# Patient Record
Sex: MALE | Race: WHITE | NOT HISPANIC OR LATINO | Employment: OTHER | URBAN - METROPOLITAN AREA
[De-identification: names, ages, dates, MRNs, and addresses within clinical notes are randomized per-mention and may not be internally consistent; named-entity substitution may affect disease eponyms.]

---

## 2017-02-06 ENCOUNTER — OFFICE VISIT (OUTPATIENT)
Dept: MEDICAL GROUP | Facility: PHYSICIAN GROUP | Age: 82
End: 2017-02-06
Payer: MEDICARE

## 2017-02-06 VITALS
OXYGEN SATURATION: 96 % | WEIGHT: 167 LBS | RESPIRATION RATE: 16 BRPM | DIASTOLIC BLOOD PRESSURE: 80 MMHG | BODY MASS INDEX: 24.73 KG/M2 | SYSTOLIC BLOOD PRESSURE: 128 MMHG | TEMPERATURE: 97.7 F | HEIGHT: 69 IN | HEART RATE: 86 BPM

## 2017-02-06 DIAGNOSIS — G20.A1 PARKINSON'S DISEASE: ICD-10-CM

## 2017-02-06 DIAGNOSIS — E78.5 DYSLIPIDEMIA: ICD-10-CM

## 2017-02-06 DIAGNOSIS — N40.0 BENIGN PROSTATIC HYPERPLASIA, PRESENCE OF LOWER URINARY TRACT SYMPTOMS UNSPECIFIED, UNSPECIFIED MORPHOLOGY: ICD-10-CM

## 2017-02-06 DIAGNOSIS — E03.9 HYPOTHYROIDISM, UNSPECIFIED TYPE: ICD-10-CM

## 2017-02-06 DIAGNOSIS — M81.0 OSTEOPOROSIS: ICD-10-CM

## 2017-02-06 DIAGNOSIS — I10 ESSENTIAL HYPERTENSION: ICD-10-CM

## 2017-02-06 PROCEDURE — 0518F FALL PLAN OF CARE DOCD: CPT | Mod: 8P | Performed by: INTERNAL MEDICINE

## 2017-02-06 PROCEDURE — 99214 OFFICE O/P EST MOD 30 MIN: CPT | Performed by: INTERNAL MEDICINE

## 2017-02-06 PROCEDURE — 1036F TOBACCO NON-USER: CPT | Performed by: INTERNAL MEDICINE

## 2017-02-06 PROCEDURE — 1100F PTFALLS ASSESS-DOCD GE2>/YR: CPT | Performed by: INTERNAL MEDICINE

## 2017-02-06 PROCEDURE — G8484 FLU IMMUNIZE NO ADMIN: HCPCS | Performed by: INTERNAL MEDICINE

## 2017-02-06 PROCEDURE — G8432 DEP SCR NOT DOC, RNG: HCPCS | Performed by: INTERNAL MEDICINE

## 2017-02-06 PROCEDURE — 4040F PNEUMOC VAC/ADMIN/RCVD: CPT | Mod: 8P | Performed by: INTERNAL MEDICINE

## 2017-02-06 PROCEDURE — 3288F FALL RISK ASSESSMENT DOCD: CPT | Performed by: INTERNAL MEDICINE

## 2017-02-06 PROCEDURE — G8420 CALC BMI NORM PARAMETERS: HCPCS | Performed by: INTERNAL MEDICINE

## 2017-02-06 RX ORDER — ALENDRONATE SODIUM 35 MG/1
35 TABLET ORAL
COMMUNITY

## 2017-02-06 NOTE — PROGRESS NOTES
Subjective:   Issa Cuello is a 85 y.o. male here today for osteoporosis, parkinson's disease, hypertension, hypothyroidism, dyslipidemia, BPH    O[facility]osis  [facility] He reports that he had a DEXA scan last Fall (2016) and was found to have osteoporosis. He is on calcium/vitamin D supplementation and was started on fosamax at that time. He reports that he is tolerating it well. He has not had any fractures or falls.     Parkinson's disease  Pt is on carbidopa/levodopa for Parkinson's disease. This is [facility].     Essential hypertension  Pt is on amlodipine 5 mg daily. He reports he is compliant with medication. BP is at goal per JNC 8 critieria today.     Denies chest pain, shortness of breath, headache, blurry vision    Hypothyroidism  Pt is on levothyroxine 75 mcg. Patient is taking medication as prescribed. Most recent labs indicate good control of thyroid.    Denies heat/cold intolerance, change in weight or appetite, change in energy, changes in skin or hair. He has chronic constipation    Dyslipidemia  Pt is on pravastatin 20 mg QHS and red yeast rice. Patient is taking medication as prescribed    Patient denies myalgias. Per chart review there is no elevated liver enzymes recently    BPH (benign prostatic hyperplasia)  Pt is on prazosin 1 mg qhs for BPH. He has good control of nocturia with this medication.          Current medicines (including changes today)  Current Outpatient Prescriptions   Medication Sig Dispense Refill   • alendronate (FOSAMAX) 35 MG tablet Take 35 mg by mouth every 7 days.     • levothyroxine (SYNTHROID) 75 MCG Tab TAKE ONE TABLET BY MOUTH EVERY DAY 90 Tab 1   • amlodipine (NORVASC) 5 MG Tab TAKE ONE TABLET BY MOUTH EVERY DAY 90 Tab 1   • prazosin (MINIPRESS) 1 MG Cap TAKE ONE CAPSULE BY MOUTH EVERY DAY EVENING 90 Cap 1   • pravastatin (PRAVACHOL) 20 MG Tab TAKE ONE TABLET BY MOUTH EVERY DAY EVENING 90 Tab 1   • carbidopa-levodopa SR (SINEMET CR)  " MG per tablet Take 1 Tab by mouth every bedtime.     • sennosides (SENOKOT) 8.6 MG Tab Take 8.6 mg by mouth 1 time daily as needed.     • carbidopa-levodopa (SINEMET)  MG Tab Take 1 Tab by mouth 3 times a day.     • Cyanocobalamin (VITAMIN B 12 PO) Take 1 Tab by mouth every day.     • Red Yeast Rice Extract (RED YEAST RICE PO) Take  by mouth.     • Omega-3 Fatty Acids (FISH OIL PO) Take  by mouth.     • Calcium Carbonate-Vitamin D (CALCIUM + D PO) Take  by mouth.     • aspirin EC (ECOTRIN) 81 MG TBEC Take 81 mg by mouth every day.       No current facility-administered medications for this visit.     He  has a past medical history of HTN (hypertension); Hypothyroidism; Dyslipidemia; Frequent urination at night; Polycythemia; Cancer (CMS-HCC) (1/2013); Arthritis; Parkinson's disease (CMS-HCC) (2/16); Osteopenia (1/16); and Cataract.    ROS   No chest pain, no shortness of breath, no headache, no blurry vision       Objective:     Blood pressure 128/80, pulse 86, temperature 36.5 °C (97.7 °F), resp. rate 16, height 1.753 m (5' 9\"), weight 75.751 kg (167 lb), SpO2 96 %. Body mass index is 24.65 kg/(m^2).   Physical Exam:  Constitutional: Alert & oriented, no acute distress  Eye: Conjunctiva clear, lids normal, no discharge  ENMT: Lips without lesions, normal external nose and ears  Neck: Trachea midline, no masses, no thyromegaly. No cervical or supraclavicular lymphadenopathy  Respiratory: Unlabored respiratory effort, lungs clear to auscultation, no wheezes, no ronchi  Cardiovascular: Normal S1, S2, no murmur, no edema  Skin: Warm, dry, good turgor, no rashes in visible areas  Neuro: resting tremor, shuffling gait  Psych: Normal mood and affect      Assessment and Plan:   The following treatment plan was discussed    1. Osteoporosis  Pt is on alendronate 35 mg qweek, calcium, vitamin D. [facility]    2. Parkinson's disease (CMS-HCC)  Pt is on sinemet, managed [facility]    3. " Essential hypertension  Well controled on amlodipine 5 mg daily. Continue current medication    4. Hypothyroidism, unspecified type  Well controlled on levothyroxine 75 mcg daily. Continue current medications and [facility]    5. Dyslipidemia  Doing well on pravastatin 20 mg and red yeast rice. Continue curent medications.     6. Benign prostatic hyperplasia, presence of lower urinary tract symptoms unspecified, unspecified morphology  Doing well on prazosin. Continue prazosin      Followup: Return in about 1 year (around 2/6/2018).    Please note that this dictation was created using voice recognition software. I have made every reasonable attempt to correct obvious errors, but I expect that there are errors of grammar and possibly content that I did not discover before finalizing the note.

## 2017-02-06 NOTE — ASSESSMENT & PLAN NOTE
[facility] He reports that he had a DEXA scan last Fall (2016) and was found to have osteoporosis. He is on calcium/vitamin D supplementation and was started on fosamax at that time. He reports that he is tolerating it well. He has not had any fractures or falls.

## 2017-02-06 NOTE — ASSESSMENT & PLAN NOTE
Pt is on amlodipine 5 mg daily. He reports he is compliant with medication. BP is at goal per JNC 8 critieria today.     Denies chest pain, shortness of breath, headache, blurry vision

## 2017-02-06 NOTE — ASSESSMENT & PLAN NOTE
Pt is on pravastatin 20 mg QHS and red yeast rice. Patient is taking medication as prescribed    Patient denies myalgias. Per chart review there is no elevated liver enzymes recently

## 2017-02-06 NOTE — ASSESSMENT & PLAN NOTE
Pt is on levothyroxine 75 mcg. Patient is taking medication as prescribed. Most recent labs indicate good control of thyroid.    Denies heat/cold intolerance, change in weight or appetite, change in energy, changes in skin or hair. He has chronic constipation

## 2017-06-23 ENCOUNTER — RESOLUTE PROFESSIONAL BILLING HOSPITAL PROF FEE (OUTPATIENT)
Dept: HOSPITALIST | Facility: MEDICAL CENTER | Age: 82
End: 2017-06-23
Payer: MEDICARE

## 2017-06-23 ENCOUNTER — APPOINTMENT (OUTPATIENT)
Dept: RADIOLOGY | Facility: MEDICAL CENTER | Age: 82
DRG: 552 | End: 2017-06-23
Attending: NEUROLOGICAL SURGERY
Payer: MEDICARE

## 2017-06-23 ENCOUNTER — HOSPITAL ENCOUNTER (INPATIENT)
Facility: MEDICAL CENTER | Age: 82
LOS: 3 days | DRG: 552 | End: 2017-06-26
Attending: EMERGENCY MEDICINE | Admitting: INTERNAL MEDICINE
Payer: MEDICARE

## 2017-06-23 ENCOUNTER — APPOINTMENT (OUTPATIENT)
Dept: RADIOLOGY | Facility: MEDICAL CENTER | Age: 82
DRG: 552 | End: 2017-06-23
Attending: EMERGENCY MEDICINE
Payer: MEDICARE

## 2017-06-23 DIAGNOSIS — S32.011A CLOSED STABLE BURST FRACTURE OF FIRST LUMBAR VERTEBRA, INITIAL ENCOUNTER (HCC): ICD-10-CM

## 2017-06-23 PROBLEM — S32.001A BURST FRACTURE OF LUMBAR VERTEBRA (HCC): Status: ACTIVE | Noted: 2017-06-23

## 2017-06-23 LAB — MAGNESIUM SERPL-MCNC: 2.3 MG/DL (ref 1.5–2.5)

## 2017-06-23 PROCEDURE — 99223 1ST HOSP IP/OBS HIGH 75: CPT | Performed by: INTERNAL MEDICINE

## 2017-06-23 PROCEDURE — 83735 ASSAY OF MAGNESIUM: CPT

## 2017-06-23 PROCEDURE — A9270 NON-COVERED ITEM OR SERVICE: HCPCS | Performed by: EMERGENCY MEDICINE

## 2017-06-23 PROCEDURE — 304562 HCHG STAT O2 MASK/CANNULA

## 2017-06-23 PROCEDURE — 700102 HCHG RX REV CODE 250 W/ 637 OVERRIDE(OP): Performed by: EMERGENCY MEDICINE

## 2017-06-23 PROCEDURE — 99285 EMERGENCY DEPT VISIT HI MDM: CPT

## 2017-06-23 PROCEDURE — 72131 CT LUMBAR SPINE W/O DYE: CPT

## 2017-06-23 PROCEDURE — 700111 HCHG RX REV CODE 636 W/ 250 OVERRIDE (IP): Performed by: EMERGENCY MEDICINE

## 2017-06-23 PROCEDURE — A9270 NON-COVERED ITEM OR SERVICE: HCPCS | Performed by: INTERNAL MEDICINE

## 2017-06-23 PROCEDURE — 700102 HCHG RX REV CODE 250 W/ 637 OVERRIDE(OP): Performed by: INTERNAL MEDICINE

## 2017-06-23 PROCEDURE — 72100 X-RAY EXAM L-S SPINE 2/3 VWS: CPT

## 2017-06-23 PROCEDURE — 770006 HCHG ROOM/CARE - MED/SURG/GYN SEMI*

## 2017-06-23 PROCEDURE — 96374 THER/PROPH/DIAG INJ IV PUSH: CPT

## 2017-06-23 PROCEDURE — 94760 N-INVAS EAR/PLS OXIMETRY 1: CPT

## 2017-06-23 RX ORDER — ONDANSETRON 4 MG/1
4 TABLET, ORALLY DISINTEGRATING ORAL EVERY 4 HOURS PRN
Status: DISCONTINUED | OUTPATIENT
Start: 2017-06-23 | End: 2017-06-26 | Stop reason: HOSPADM

## 2017-06-23 RX ORDER — TRAMADOL HYDROCHLORIDE 50 MG/1
50 TABLET ORAL ONCE
Status: COMPLETED | OUTPATIENT
Start: 2017-06-23 | End: 2017-06-23

## 2017-06-23 RX ORDER — ONDANSETRON 4 MG/1
4 TABLET, ORALLY DISINTEGRATING ORAL ONCE
Status: COMPLETED | OUTPATIENT
Start: 2017-06-23 | End: 2017-06-23

## 2017-06-23 RX ORDER — OXYCODONE HYDROCHLORIDE 5 MG/1
5 TABLET ORAL
Status: DISCONTINUED | OUTPATIENT
Start: 2017-06-23 | End: 2017-06-26 | Stop reason: HOSPADM

## 2017-06-23 RX ORDER — MORPHINE SULFATE 4 MG/ML
4 INJECTION, SOLUTION INTRAMUSCULAR; INTRAVENOUS
Status: DISCONTINUED | OUTPATIENT
Start: 2017-06-23 | End: 2017-06-26 | Stop reason: HOSPADM

## 2017-06-23 RX ORDER — AMLODIPINE BESYLATE 5 MG/1
5 TABLET ORAL
Status: DISCONTINUED | OUTPATIENT
Start: 2017-06-23 | End: 2017-06-26 | Stop reason: HOSPADM

## 2017-06-23 RX ORDER — LIDOCAINE HYDROCHLORIDE AND EPINEPHRINE BITARTRATE 20; .01 MG/ML; MG/ML
10 INJECTION, SOLUTION SUBCUTANEOUS ONCE
Status: DISCONTINUED | OUTPATIENT
Start: 2017-06-23 | End: 2017-06-23 | Stop reason: CLARIF

## 2017-06-23 RX ORDER — POLYETHYLENE GLYCOL 3350 17 G/17G
1 POWDER, FOR SOLUTION ORAL
Status: DISCONTINUED | OUTPATIENT
Start: 2017-06-23 | End: 2017-06-26 | Stop reason: HOSPADM

## 2017-06-23 RX ORDER — CARBIDOPA AND LEVODOPA 50; 200 MG/1; MG/1
1 TABLET, EXTENDED RELEASE ORAL
Status: DISCONTINUED | OUTPATIENT
Start: 2017-06-23 | End: 2017-06-26 | Stop reason: HOSPADM

## 2017-06-23 RX ORDER — MORPHINE SULFATE 4 MG/ML
2 INJECTION, SOLUTION INTRAMUSCULAR; INTRAVENOUS ONCE
Status: COMPLETED | OUTPATIENT
Start: 2017-06-23 | End: 2017-06-23

## 2017-06-23 RX ORDER — PRAZOSIN HYDROCHLORIDE 1 MG/1
1 CAPSULE ORAL EVERY EVENING
Status: DISCONTINUED | OUTPATIENT
Start: 2017-06-23 | End: 2017-06-26 | Stop reason: HOSPADM

## 2017-06-23 RX ORDER — OXYCODONE HYDROCHLORIDE 10 MG/1
10 TABLET ORAL
Status: DISCONTINUED | OUTPATIENT
Start: 2017-06-23 | End: 2017-06-26 | Stop reason: HOSPADM

## 2017-06-23 RX ORDER — AMOXICILLIN 250 MG
2 CAPSULE ORAL 2 TIMES DAILY PRN
Status: DISCONTINUED | OUTPATIENT
Start: 2017-06-23 | End: 2017-06-26 | Stop reason: HOSPADM

## 2017-06-23 RX ORDER — AMOXICILLIN 250 MG
1 CAPSULE ORAL
COMMUNITY
End: 2018-04-05

## 2017-06-23 RX ORDER — BISACODYL 10 MG
10 SUPPOSITORY, RECTAL RECTAL
Status: DISCONTINUED | OUTPATIENT
Start: 2017-06-23 | End: 2017-06-26 | Stop reason: HOSPADM

## 2017-06-23 RX ORDER — ONDANSETRON 2 MG/ML
4 INJECTION INTRAMUSCULAR; INTRAVENOUS EVERY 4 HOURS PRN
Status: DISCONTINUED | OUTPATIENT
Start: 2017-06-23 | End: 2017-06-26 | Stop reason: HOSPADM

## 2017-06-23 RX ORDER — ALENDRONATE SODIUM 10 MG/1
35 TABLET ORAL
Status: DISCONTINUED | OUTPATIENT
Start: 2017-06-25 | End: 2017-06-26 | Stop reason: HOSPADM

## 2017-06-23 RX ORDER — PRAVASTATIN SODIUM 10 MG
20 TABLET ORAL EVERY EVENING
Status: DISCONTINUED | OUTPATIENT
Start: 2017-06-23 | End: 2017-06-26 | Stop reason: HOSPADM

## 2017-06-23 RX ORDER — LEVOTHYROXINE SODIUM 0.07 MG/1
75 TABLET ORAL
Status: DISCONTINUED | OUTPATIENT
Start: 2017-06-23 | End: 2017-06-26 | Stop reason: HOSPADM

## 2017-06-23 RX ADMIN — OXYCODONE HYDROCHLORIDE 5 MG: 5 TABLET ORAL at 15:08

## 2017-06-23 RX ADMIN — MORPHINE SULFATE 2 MG: 4 INJECTION INTRAVENOUS at 11:49

## 2017-06-23 RX ADMIN — PRAZOSIN HYDROCHLORIDE 1 MG: 1 CAPSULE ORAL at 20:57

## 2017-06-23 RX ADMIN — TRAMADOL HYDROCHLORIDE 50 MG: 50 TABLET, COATED ORAL at 09:07

## 2017-06-23 RX ADMIN — ASPIRIN 81 MG: 81 TABLET ORAL at 18:33

## 2017-06-23 RX ADMIN — CARBIDOPA AND LEVODOPA 1 TABLET: 50; 200 TABLET, EXTENDED RELEASE ORAL at 20:57

## 2017-06-23 RX ADMIN — LEVOTHYROXINE SODIUM 75 MCG: 75 TABLET ORAL at 18:33

## 2017-06-23 RX ADMIN — PRAVASTATIN SODIUM 20 MG: 10 TABLET ORAL at 20:57

## 2017-06-23 RX ADMIN — ONDANSETRON 4 MG: 4 TABLET, ORALLY DISINTEGRATING ORAL at 09:07

## 2017-06-23 RX ADMIN — CARBIDOPA AND LEVODOPA 1 TABLET: 25; 100 TABLET ORAL at 20:57

## 2017-06-23 RX ADMIN — AMLODIPINE BESYLATE 5 MG: 5 TABLET ORAL at 18:32

## 2017-06-23 RX ADMIN — OXYCODONE HYDROCHLORIDE 5 MG: 5 TABLET ORAL at 18:32

## 2017-06-23 RX ADMIN — VITAMIN D, TAB 1000IU (100/BT) 1000 UNITS: 25 TAB at 18:32

## 2017-06-23 RX ADMIN — OXYCODONE HYDROCHLORIDE 10 MG: 10 TABLET ORAL at 21:02

## 2017-06-23 ASSESSMENT — COPD QUESTIONNAIRES
HAVE YOU SMOKED AT LEAST 100 CIGARETTES IN YOUR ENTIRE LIFE: NO/DON'T KNOW
COPD SCREENING SCORE: 5
DURING THE PAST 4 WEEKS HOW MUCH DID YOU FEEL SHORT OF BREATH: NONE/LITTLE OF THE TIME
HAVE YOU SMOKED AT LEAST 100 CIGARETTES IN YOUR ENTIRE LIFE: NO/DON'T KNOW
COPD SCREENING SCORE: 5
DO YOU EVER COUGH UP ANY MUCUS OR PHLEGM?: YES, EVERY DAY
DO YOU EVER COUGH UP ANY MUCUS OR PHLEGM?: YES, EVERY DAY
DURING THE PAST 4 WEEKS HOW MUCH DID YOU FEEL SHORT OF BREATH: NONE/LITTLE OF THE TIME

## 2017-06-23 ASSESSMENT — PAIN SCALES - GENERAL
PAINLEVEL_OUTOF10: 10
PAINLEVEL_OUTOF10: 8
PAINLEVEL_OUTOF10: 10
PAINLEVEL_OUTOF10: 8

## 2017-06-23 ASSESSMENT — LIFESTYLE VARIABLES
EVER_SMOKED: NEVER
DO YOU DRINK ALCOHOL: NO
EVER_SMOKED: NEVER
ALCOHOL_USE: NO

## 2017-06-23 NOTE — IP AVS SNAPSHOT
6/26/2017    Issa Cuello  [address] [facility] [address]  [place] [address]    Dear Issa:    [facility] wants to ensure your discharge home is safe and you or your loved ones have had all of your questions answered regarding your care after you leave the hospital.    Below is a list of resources and contact information should you have any questions regarding your hospital stay, follow-up instructions, or active medical symptoms.    Questions or Concerns Regarding… Contact   Medical Questions Related to Your Discharge  (7 days a week, 8am-5pm) Contact a Nurse Care Coordinator   [phone]   Medical Questions Not Related to Your Discharge  (24 hours a day / 7 days a week)  Contact the Nurse Health Line   [phone]    Medications or Discharge Instructions Refer to your discharge packet   or contact your [facility] Primary Care Provider   [phone]   Follow-up Appointment(s) Schedule your appointment via [employer]   or contact Scheduling [phone]   Billing Review your statement via [employer]  or contact Billing [phone]   Medical Records Review your records via [employer]   or contact Medical Records [phone]     You may receive a telephone call within two days of discharge. This call is to make certain you understand your discharge instructions and have the opportunity to have any questions answered. You can also easily access your medical information, test results and upcoming appointments via the [employer] free online health management tool. You can learn more and sign up at [employer]. For assistance setting up your [employer] account, please call [phone].    Once again, we want to ensure your discharge home is safe and that you have a clear understanding of any next steps in your care. If you have any questions or concerns, please do not hesitate to contact us, we are here for you. Thank you for choosing [facility] for your healthcare needs.    Sincerely,    Your [facility] Healthcare Team

## 2017-06-23 NOTE — IP AVS SNAPSHOT
" <p align=\"LEFT\"><IMG SRC=\"[email or web address]\" alt=\"Image\" WIDTH=\"50%\" HEIGHT=\"200\" BORDER=\"\"></p>                   Name:Issa Cuello  Medical Record Number:[id number]  CSN: [id number]    YOB: 1931   Age: 85 y.o.  Sex: male  HT:1.753 m (5' 9\") WT: 72.576 kg (160 lb)          Admit Date: 6/23/2017     Discharge Date:   Today's Date: 6/26/2017  Attending Doctor:  Teodoro Pickard M.D.                  Allergies:  Influenza vaccines          Follow-up Information     1. Follow up with Chad Howe M.D.. Schedule an appointment as soon as possible for a visit in 3 months.    Specialty:  Neurosurgery    Contact information    [address]  [address]  [facility] [address] [phone]          2. Follow up with Celso Steward M.D.. Schedule an appointment as soon as possible for a visit in 2 weeks.    Specialty:  Internal Medicine    Contact information    [address]  [facility] [address] [phone]           Medication List      Take these Medications        Instructions    alendronate 35 MG tablet   Commonly known as:  FOSAMAX    Take 35 mg by mouth every Sunday.   Dose:  35 mg       amlodipine 5 MG Tabs   Commonly known as:  NORVASC    TAKE ONE TABLET BY MOUTH EVERY DAY       aspirin EC 81 MG Tbec   Commonly known as:  ECOTRIN    Take 81 mg by mouth every day.   Dose:  81 mg       CALCIUM + D PO    Take 1 Tab by mouth every day.   Dose:  1 Tab       * carbidopa-levodopa  MG Tabs   Commonly known as:  SINEMET    Take 1 Tab by mouth 2 Times a Day.   Dose:  1 Tab       * carbidopa-levodopa SR  MG per tablet   Commonly known as:  SINEMET CR    Take 1 Tab by mouth every bedtime.   Dose:  1 Tab       FISH OIL PO    Take 2 Tabs by mouth every day.   Dose:  2 Tab       levothyroxine 75 MCG Tabs   Commonly known as:  SYNTHROID    TAKE ONE TABLET BY MOUTH EVERY DAY       pravastatin 20 MG Tabs   Commonly known as:  PRAVACHOL    TAKE ONE TABLET BY MOUTH EVERY DAY EVENING      "    prazosin 1 MG Caps   Commonly known as:  MINIPRESS    TAKE ONE CAPSULE BY MOUTH EVERY DAY EVENING       RED YEAST RICE PO    Take 1 Tab by mouth 2 Times a Day.   Dose:  1 Tab       * senna-docusate 8.6-50 MG Tabs   What changed:  Another medication with the same name was added. Make sure you understand how and when to take each.   Commonly known as:  PERICOLACE or SENOKOT S    Take 1 Tab by mouth 1 time daily as needed.   Dose:  1 Tab       * senna-docusate 8.6-50 MG Tabs   What changed:  You were already taking a medication with the same name, and this prescription was added. Make sure you understand how and when to take each.   Commonly known as:  PERICOLACE or SENOKOT S    Take 2 Tabs by mouth 2 times a day as needed for Constipation.   Dose:  2 Tab       vitamin D 1000 UNIT Tabs   Commonly known as:  cholecalciferol    Take 1,000 Units by mouth every day.   Dose:  1000 Units       * Notice:  This list has 4 medication(s) that are the same as other medications prescribed for you. Read the directions carefully, and ask your doctor or other care provider to review them with you.

## 2017-06-23 NOTE — IP AVS SNAPSHOT
" Home Care Instructions                                                                                                                  Name:Issa Cuello  Medical Record Number:[id number]  CSN: [id number]    YOB: 1931   Age: 85 y.o.  Sex: male  HT:1.753 m (5' 9\") WT: 72.576 kg (160 lb)          Admit Date: 6/23/2017     Discharge Date:   Today's Date: 6/26/2017  Attending Doctor:  Teodoro Pickard M.D.                  Allergies:  Influenza vaccines            Discharge Instructions       Discharge Instructions    Discharged to [facility] by medical transportation with escort. Discharged via wheelchair, hospital escort: Yes.  Special equipment needed: Walker    Be sure to schedule a follow-up appointment with your primary care doctor or any specialists as instructed.     Discharge Plan:   Diet Plan: Discussed  Activity Level: Discussed  Confirmed Follow up Appointment: Patient to Call and Schedule Appointment  Confirmed Symptoms Management: Discussed  Medication Reconciliation Updated: Yes  Influenza Vaccine Indication: Patient Refuses    I understand that a diet low in cholesterol, fat, and sodium is recommended for good health. Unless I have been given specific instructions below for another diet, I accept this instruction as my diet prescription.   Other diet: Dysphagia 2 nector thick diet    Special Instructions: Discharge instructions for the Orthopedic Patient    Follow up with Primary Care Physician within 2 weeks of discharge to home, regarding:  Review of medications and diagnostic testing.  Surveillance for medical complications.  Workup and treatment of osteoporosis, if appropriate.     -Is this a Joint Replacement patient? No    -Is this patient being discharged with medication to prevent blood clots?  No    · Is patient discharged on Warfarin / Coumadin?   No     · Is patient Post Blood Transfusion?  No    Lumbar Fracture  A lumbar fracture is a break in one of the bones of the lower back. " Lumbar fractures range in severity. Severe fractures can damage the spinal cord.  CAUSES  This condition may be caused by:  · A fall (common).  · A car accident (common).  · A gunshot wound.  · A hard, direct hit to the back.  · Osteoporosis.  SYMPTOMS  The main symptom of this condition is severe pain in the lower back. If a fracture is complex or severe, there may also be:  · A misshapen or swollen area on the lower back.  · A limited ability to move an area of the lower back.  · An inability to empty the bladder or bowel.  · A loss of strength or sensation in the legs, feet, and toes.  · Paralysis.  DIAGNOSIS  This condition is diagnosed based on:  · A physical exam.  · Symptoms and what happened just before they developed.  · The results of imaging tests, such as an X-ray, CT scan, or MRI.  If your nerves have been damaged, you may also have other tests to find out how much damage there is.  TREATMENT  Treatment for this condition depends on the specifics of the injury. Most fractures can be treated with:  · A back brace.  · Bed rest and activity restrictions.  · Pain medicine.  · Physical therapy.  Fractures that are complex, involve multiple bones, or make the spine unstable may require surgery to remove pressure from the nerves or spinal cord and to stabilize the broken pieces of bone.  During recovery, it is normal to have pain and stiffness in the back for weeks.  HOME CARE INSTRUCTIONS  Medicines  · Take medicines only as directed by your health care provider.  · Do not drive or operate heavy machinery while taking pain medicine.   Activity  · Stay in bed for as long as directed by your health care provider.  · If you were shown how to do any exercises to improve motion and strength in your back, do them as directed by your health care provider.  · Return to your normal activities as directed by your health care provider. Ask your health care provider what activities are safe for you.  General  Instructions  · If you were given a neck brace or back brace, wear it as directed by your health care provider.  · Keep all follow-up visits as directed by your health care provider. This is important. Failure to follow-up as recommended could result in permanent injury, disability, and long-lasting (chronic) pain.  SEEK MEDICAL CARE IF:  · Your pain does not improve over time.  · You have a persistent cough.  · You cannot return to your normal activities as planned or expected.  SEEK IMMEDIATE MEDICAL CARE IF:  · You have severe pain or your pain suddenly gets worse.  · You are unable to move.  · You have numbness, tingling, weakness, or paralysis in any part of your body.  · You cannot control your bladder or bowel.  · You have difficulty breathing.  · You have a fever.  · You have pain in your chest or abdomen.  · You vomit.     This information is not intended to replace advice given to you by your health care provider. Make sure you discuss any questions you have with your health care provider.     Document Released: 04/03/2008 Document Revised: 05/03/2016 Document Reviewed: 12/14/2015  [employer] Interactive Patient Education ©2016 [employer]    Depression / Suicide Risk    As you are discharged from this [facility] facility, it is important to learn how to keep safe from harming yourself.    Recognize the warning signs:  · Abrupt changes in personality, positive or negative- including increase in energy   · Giving away possessions  · Change in eating patterns- significant weight changes-  positive or negative  · Change in sleeping patterns- unable to sleep or sleeping all the time   · Unwillingness or inability to communicate  · Depression  · Unusual sadness, discouragement and loneliness  · Talk of wanting to die  · Neglect of personal appearance   · Rebelliousness- reckless behavior  · Withdrawal from people/activities they love  · Confusion- inability to concentrate     If you or a loved one observes  any of these behaviors or has concerns about self-harm, here's what you can do:  · Talk about it- your feelings and reasons for harming yourself  · Remove any means that you might use to hurt yourself (examples: pills, rope, extension cords, firearm)  · Get professional help from the community (Mental Health, Substance Abuse, psychological counseling)  · Do not be alone:Call your Safe Contact- someone whom you trust who will be there for you.  · Call your local CRISIS HOTLINE 634-0979 or 462-130-2010  · Call your local Children's Mobile Crisis Response Team [place] (554) 201-1216 or www.[employer]  · Call the toll free National Suicide Prevention Hotlines   · National Suicide Prevention Lifeline 283-218-DBVW (0111)  · [place] Hope Line Network 800-SUICIDE (685-2351)        Follow-up Information     1. Follow up with Chad Howe M.D.. Schedule an appointment as soon as possible for a visit in 3 months.    Specialty:  Neurosurgery    Contact information    [address]  [address]  [place] [address] [phone]          2. Follow up with Celso Steward M.D.. Schedule an appointment as soon as possible for a visit in 2 weeks.    Specialty:  Internal Medicine    Contact information    [address]  [place] [address] [phone]           Discharge Medication Instructions:    Below are the medications your physician expects you to take upon discharge:    Review all your home medications and newly ordered medications with your doctor and/or pharmacist. Follow medication instructions as directed by your doctor and/or pharmacist.    Please keep your medication list with you and share with your physician.               Medication List      CHANGE how you take these medications        Instructions    Morning Afternoon Evening Bedtime    * senna-docusate 8.6-50 MG Tabs   What changed:  Another medication with the same name was added. Make sure you understand how and when to take each.    Commonly known as:  PERICOLACE or SENOKOT S        Take 1 Tab by mouth 1 time daily as needed.   Dose:  1 Tab                        * senna-docusate 8.6-50 MG Tabs   What changed:  You were already taking a medication with the same name, and this prescription was added. Make sure you understand how and when to take each.   Commonly known as:  PERICOLACE or SENOKOT S        Take 2 Tabs by mouth 2 times a day as needed for Constipation.   Dose:  2 Tab                        * Notice:  This list has 2 medication(s) that are the same as other medications prescribed for you. Read the directions carefully, and ask your doctor or other care provider to review them with you.      CONTINUE taking these medications        Instructions    Morning Afternoon Evening Bedtime    alendronate 35 MG tablet   Last time this was given:  35 mg on 6/25/2017  9:42 AM   Commonly known as:  FOSAMAX        Take 35 mg by mouth every Sunday.   Dose:  35 mg                        amlodipine 5 MG Tabs   Last time this was given:  5 mg on 6/25/2017  4:20 PM   Commonly known as:  NORVASC        TAKE ONE TABLET BY MOUTH EVERY DAY                        aspirin EC 81 MG Tbec   Last time this was given:  81 mg on 6/26/2017  9:09 AM   Commonly known as:  ECOTRIN        Take 81 mg by mouth every day.   Dose:  81 mg                        CALCIUM + D PO        Take 1 Tab by mouth every day.   Dose:  1 Tab                        * carbidopa-levodopa  MG Tabs   Last time this was given:  1 Tab on 6/26/2017  2:39 PM   Commonly known as:  SINEMET        Take 1 Tab by mouth 2 Times a Day.   Dose:  1 Tab                        * carbidopa-levodopa SR  MG per tablet   Last time this was given:  1 Tab on 6/25/2017 10:25 PM   Commonly known as:  SINEMET CR        Take 1 Tab by mouth every bedtime.   Dose:  1 Tab                        FISH OIL PO        Take 2 Tabs by mouth every day.   Dose:  2 Tab                        levothyroxine 75 MCG Tabs    Last time this was given:  75 mcg on 6/25/2017  4:20 PM   Commonly known as:  SYNTHROID        TAKE ONE TABLET BY MOUTH EVERY DAY                        pravastatin 20 MG Tabs   Last time this was given:  20 mg on 6/25/2017 10:25 PM   Commonly known as:  PRAVACHOL        TAKE ONE TABLET BY MOUTH EVERY DAY EVENING                        prazosin 1 MG Caps   Last time this was given:  1 mg on 6/25/2017 10:30 PM   Commonly known as:  MINIPRESS        TAKE ONE CAPSULE BY MOUTH EVERY DAY EVENING                        RED YEAST RICE PO        Take 1 Tab by mouth 2 Times a Day.   Dose:  1 Tab                        vitamin D 1000 UNIT Tabs   Last time this was given:  1,000 Units on 6/26/2017  9:09 AM   Commonly known as:  cholecalciferol        Take 1,000 Units by mouth every day.   Dose:  1000 Units                        * Notice:  This list has 2 medication(s) that are the same as other medications prescribed for you. Read the directions carefully, and ask your doctor or other care provider to review them with you.         Where to Get Your Medications      Information about where to get these medications is not yet available     ! Ask your nurse or doctor about these medications    - senna-docusate 8.6-50 MG Tabs            Instructions           Diet / Nutrition:    Follow any diet instructions given to you by your doctor or the dietician, including how much salt (sodium) you are allowed each day.    If you are overweight, talk to your doctor about a weight reduction plan.    Activity:    Remain physically active following your doctor's instructions about exercise and activity.    Rest often.     Any time you become even a little tired or short of breath, SIT DOWN and rest.    Worsening Symptoms:    Report any of the following signs and symptoms to the doctor's office immediately:    *Pain of jaw, arm, or neck  *Chest pain not relieved by medication                               *Dizziness or loss of  consciousness  *Difficulty breathing even when at rest   *More tired than usual                                       *Bleeding drainage or swelling of surgical site  *Swelling of feet, ankles, legs or stomach                 *Fever (>100ºF)  *Pink or blood tinged sputum  *Weight gain (3lbs/day or 5lbs /week)           *Shock from internal defibrillator (if applicable)  *Palpitations or irregular heartbeats                *Cool and/or numb extremities    Stroke Awareness    Common Risk Factors for Stroke include:    Age  Atrial Fibrillation  Carotid Artery Stenosis  Diabetes Mellitus  Excessive alcohol consumption  High blood pressure  Overweight   Physical inactivity  Smoking    Warning signs and symptoms of a stroke include:    *Sudden numbness or weakness of the face, arm or leg (especially on one side of the body).  *Sudden confusion, trouble speaking or understanding.  *Sudden trouble seeing in one or both eyes.  *Sudden trouble walking, dizziness, loss of balance or coordination.Sudden severe headache with no known cause.    It is very important to get treatment quickly when a stroke occurs. If you experience any of the above warning signs, call 911 immediately.                   Disclaimer         Quit Smoking / Tobacco Use:    I understand the use of any tobacco products increases my chance of suffering from future heart disease or stroke and could cause other illnesses which may shorten my life. Quitting the use of tobacco products is the single most important thing I can do to improve my health. For further information on smoking / tobacco cessation call a Toll Free Quit Line at 1-325.968.4677 (*[employer]) or 1-685.282.9328 (American Lung Association) or you can access the web based program at www.lungusa.org.    [place] Tobacco Users Help Line:  (486) 537-4478       Toll Free: 1-806.539.5118  Quit Tobacco Program [facility] (942)485-4408    Crisis Hotline:    National  Crisis Hotline:  1-428-QRYEMNL or 1-687.281.2916    [place] Crisis Hotline:    1-998.913.9067 or 568-188-6948    Discharge Survey:   Thank you for choosing [facility]. We hope we did everything we could to make your hospital stay a pleasant one. You may be receiving a phone survey and we would appreciate your time and participation in answering the questions. Your input is very valuable to us in our efforts to improve our service to our patients and their families.        My signature on this form indicates that:    1. I have reviewed and understand the above information.  2. My questions regarding this information have been answered to my satisfaction.  3. I have formulated a plan with my discharge nurse to obtain my prescribed medications for home.                  Disclaimer         __________________________________                     __________       ________                       Patient Signature                                                 Date                    Time

## 2017-06-23 NOTE — ED NOTES
"Assumed care of pt, pt to have xray done , standing,, TSLO brace placed, pt at baseline with standing, \" normally stands hunched over\" , ERP aware   "

## 2017-06-23 NOTE — ED PROVIDER NOTES
"      ED Provider Note    Scribed for Mary Garza M.D. by Noemí Bennett. 6/23/2017, 8:42 AM.    Primary Care Provider: Celso Steward M.D.  Means of arrival: Walk-in  History obtained from: Patient  History limited by: None    CHIEF COMPLAINT  Chief Complaint   Patient presents with   • T-5000 GLF       HPI  Issa Cuello is a 85 y.o. male who presents to the Emergency Department for evaluation of lower back pain post T-5000 fall around 1100 yesterday. Per patient, he fell \"on his butt\" yesterday while walking his dog due to a loss of balance. He denies any injuries to his head. His wife reports that the patient has had a difficult time walking and getting up out of the bed due to the pain but he is still ambulatory. The patient states that he has only been able to urinate small amounts due to the pain. He denies any loss of sensation in his genital areas. He also states that he was not able to eat yesterday after the incident because he was \"sick to his stomach.\" The patient has a history of arthritis in his back.     REVIEW OF SYSTEMS  Pertinent positives include lower back pain. Pertinent negatives include no loss of sensation in his legs, no head injury.   E.    PAST MEDICAL HISTORY   has a past medical history of HTN (hypertension); Hypothyroidism; Dyslipidemia; Frequent urination at night; Polycythemia; Cancer (CMS-HCC) (1/2013); Arthritis; Parkinson's disease (CMS-HCC) (2/16); Osteopenia (1/16); and Cataract.    SOCIAL HISTORY  Social History   Substance Use Topics   • Smoking status: Never Smoker    • Smokeless tobacco: Never Used   • Alcohol Use: No      History   Drug Use No       SURGICAL HISTORY   has past surgical history that includes inguinal hernia repair; tonsillectomy; other; and cataract extraction with iol (4/3/14).     CURRENT MEDICATIONS  No current facility-administered medications on file prior to encounter.     Current Outpatient Prescriptions on File Prior to Encounter " "  Medication Sig Dispense Refill   • alendronate (FOSAMAX) 35 MG tablet Take 35 mg by mouth every Sunday.     • levothyroxine (SYNTHROID) 75 MCG Tab TAKE ONE TABLET BY MOUTH EVERY DAY 90 Tab 1   • amlodipine (NORVASC) 5 MG Tab TAKE ONE TABLET BY MOUTH EVERY DAY 90 Tab 1   • prazosin (MINIPRESS) 1 MG Cap TAKE ONE CAPSULE BY MOUTH EVERY DAY EVENING 90 Cap 1   • pravastatin (PRAVACHOL) 20 MG Tab TAKE ONE TABLET BY MOUTH EVERY DAY EVENING 90 Tab 1   • carbidopa-levodopa SR (SINEMET CR)  MG per tablet Take 1 Tab by mouth every bedtime.     • carbidopa-levodopa (SINEMET)  MG Tab Take 1 Tab by mouth 2 Times a Day.     • Red Yeast Rice Extract (RED YEAST RICE PO) Take 1 Tab by mouth 2 Times a Day.     • Omega-3 Fatty Acids (FISH OIL PO) Take 2 Tabs by mouth every day.     • Calcium Carbonate-Vitamin D (CALCIUM + D PO) Take 1 Tab by mouth every day.     • aspirin EC (ECOTRIN) 81 MG TBEC Take 81 mg by mouth every day.       ALLERGIES  Allergies   Allergen Reactions   • Influenza Vaccines        PHYSICAL EXAM  VITAL SIGNS: /81 mmHg  Pulse 93  Temp(Src) 36.9 °C (98.5 °F)  Resp 16  Ht 1.753 m (5' 9\")  Wt 72.576 kg (160 lb)  BMI 23.62 kg/m2  SpO2 97%  Constitutional: Alert in no apparent distress. Well appearing  HENT: Normocephalic, Atraumatic, Bilateral external ears normal. Nose normal.   Eyes:  Conjunctiva normal, non-icteric.   Lungs: Non-labored respirations  Skin: Warm, Dry, No erythema, No rash.   Neurologic: Alert, Grossly non-focal. Intact sensation lower extremities.  Psychiatric: Affect normal, Judgment normal, Mood normal, Appears appropriate and not intoxicated.   Back: No L spine deformity or distinct point tenderness, indicates mild pain in mid L spine,     RADIOLOGY  CT-LSPINE W/O PLUS RECONS   Final Result         1. Acute comminuted burst fracture of L1 with minimal retropulsed fragment.      CRITICAL RESULT READ BACK: Preliminary findings discussed with and critical read back performed " by Dr. NAOMI PARKS in the Emergency Department via telephone on 6/23/2017 11:10 AM         DX-LUMBAR SPINE-2 OR 3 VIEWS    (Results Pending)     The radiologist's interpretation of all radiological studies have been reviewed by me.    COURSE & MEDICAL DECISION MAKING  Pertinent Labs & Imaging studies reviewed. (See chart for details)    8:42 AM - Patient seen and examined at bedside. Patient will be treated with 4 mg dispertab Zofran, 50 mg Ultram tablet. Ordered L spine CT to evaluate his symptoms.    10:22 AM Recheck: Patient re-evaluated at beside. Patient reports feeling slightly improved secondary to Ultram. Discussed patient's condition and treatment plan. The patient understood and is in agreement.     11:19 AM Paged neurosurgery.    11:26 AM Spoke with Dr. Krause, Neurosurgery, about the patient's condition. He will order TLSO and upper x-rays.    11:24 AM Paged Hospitalist.    11:29 AM Spoke with Dr. Atwood, Hospitalist, about the patient's condition. He will admit the patient.    11:33 AM Recheck: Patient re-evaluated at [facility]. Discussed patient's condition and treatment plan to admit him discussed. Patient's radiology results which revealed a L1 fracture discussed. The patient understood and is in agreement.       DISPOSITION:  Patient will be admitted to Dr. Atwood in stable condition.    FINAL IMPRESSION  1. Closed stable burst fracture of first lumbar vertebra, initial encounter (CMS-HCC)         This dictation has been created using voice recognition software and/or scribes. The accuracy of the dictation is limited by the abilities of the software and the expertise of the scribes. I expect there may be some errors of grammar and possibly content. I made every attempt to manually correct the errors within my dictation. However, errors related to voice recognition software and/or scribes may still exist and should be interpreted within the appropriate context.     INoemí  (Scribe), am scribing for, and in the presence of, Mary Garza M.D..    Electronically signed by: Noemí Bennett (Scribe), 6/23/2017    I, Mary Garza M.D. personally performed the services described in this documentation, as scribed by Noemí Bennett in my presence, and it is both accurate and complete.    The note accurately reflects work and decisions made by me.  Mary Garza  6/23/2017  3:07 PM

## 2017-06-23 NOTE — IP AVS SNAPSHOT
[employer] Access Code: Activation code not generated  Current [employer] Status: Active    SW[employer]  A secure, online tool to manage your health information     [employer]’s [employer] e-Media® is a secure, online tool that connects you to your personalized health information from the privacy of your home -- day or night - making it very easy for you to manage your healthcare. Once the activation process is completed, you can even access your medical information using the [employer] e-Media keya, which is available for free in the Apple Keya store or Google Play store.     [employer] provides the following levels of access (as shown below):   My Chart Features   [facility] Primary Care Doctor [facility]  Specialists [facility]  Urgent  Care Non-[facility]  Primary Care  Doctor   Email your healthcare team securely and privately 24/7 X X X X   Manage appointments: schedule your next appointment; view details of past/upcoming appointments X      Request prescription refills. X      View recent personal medical records, including lab and immunizations X X X X   View health record, including health history, allergies, medications X X X X   Read reports about your outpatient visits, procedures, consult and ER notes X X X X   See your discharge summary, which is a recap of your hospital and/or ER visit that includes your diagnosis, lab results, and care plan. X X       How to register for [employer]:  1. Go to  [email or web address].  2. Click on the Sign Up Now box, which takes you to the New Member Sign Up page. You will need to provide the following information:  a. Enter your [employer] Access Code exactly as it appears at the top of this page. (You will not need to use this code after you’ve completed the sign-up process. If you do not sign up before the expiration date, you must request a new code.)   b. Enter your date of birth.   c. Enter your home email address.   d. Click Submit, and follow the next screen’s instructions.  3. Create a [employer] ID. This will  be your [employer] login ID and cannot be changed, so think of one that is secure and easy to remember.  4. Create a [employer] password. You can change your password at any time.  5. Enter your Password Reset Question and Answer. This can be used at a later time if you forget your password.   6. Enter your e-mail address. This allows you to receive e-mail notifications when new information is available in [employer].  7. Click Sign Up. You can now view your health information.    For assistance activating your [employer] account, call [phone]

## 2017-06-23 NOTE — PROGRESS NOTES
Full dictation to follow  Reviewed images  L1 burst, appears stable on CT  I have ordered TLSO which I would like him to wear when OOB  I will order him upright lumbar xrays in brace

## 2017-06-23 NOTE — ED NOTES
Pt reports he fell yesterday while feeding the dog, pt walked away from his walker. Pt reports falling straight to his bottom. Pt inNAD. VSS. Pt denies taking blood thinners. Denies any other injury.

## 2017-06-23 NOTE — ED NOTES
The Medication Reconciliation process has been completed by interviewing the patient's wife who had a list.     Allergies have been reviewed  Antibiotic use in 30 days - none    Home [facility]:  [facility]

## 2017-06-23 NOTE — ED NOTES
"Pt reports lumbar back pain after \"falling straight onto bottom\" yesterday afternoon.  Pt reports he was walking his dog, stepped away from his walker and lost his balance.  Pt has history of Parkinson's.   Pt changed into a gown.  Wife at bedside.   "

## 2017-06-24 LAB
ANION GAP SERPL CALC-SCNC: 13 MMOL/L (ref 0–11.9)
BASOPHILS # BLD AUTO: 0.2 % (ref 0–1.8)
BASOPHILS # BLD: 0.03 K/UL (ref 0–0.12)
BUN SERPL-MCNC: 34 MG/DL (ref 8–22)
CALCIUM SERPL-MCNC: 9.3 MG/DL (ref 8.5–10.5)
CHLORIDE SERPL-SCNC: 104 MMOL/L (ref 96–112)
CO2 SERPL-SCNC: 23 MMOL/L (ref 20–33)
CREAT SERPL-MCNC: 1.37 MG/DL (ref 0.5–1.4)
EOSINOPHIL # BLD AUTO: 0.02 K/UL (ref 0–0.51)
EOSINOPHIL NFR BLD: 0.2 % (ref 0–6.9)
ERYTHROCYTE [DISTWIDTH] IN BLOOD BY AUTOMATED COUNT: 45.9 FL (ref 35.9–50)
GFR SERPL CREATININE-BSD FRML MDRD: 49 ML/MIN/1.73 M 2
GLUCOSE SERPL-MCNC: 111 MG/DL (ref 65–99)
HCT VFR BLD AUTO: 48.2 % (ref 42–52)
HGB BLD-MCNC: 16.3 G/DL (ref 14–18)
IMM GRANULOCYTES # BLD AUTO: 0.04 K/UL (ref 0–0.11)
IMM GRANULOCYTES NFR BLD AUTO: 0.3 % (ref 0–0.9)
LYMPHOCYTES # BLD AUTO: 0.98 K/UL (ref 1–4.8)
LYMPHOCYTES NFR BLD: 7.4 % (ref 22–41)
MCH RBC QN AUTO: 32.3 PG (ref 27–33)
MCHC RBC AUTO-ENTMCNC: 33.8 G/DL (ref 33.7–35.3)
MCV RBC AUTO: 95.4 FL (ref 81.4–97.8)
MONOCYTES # BLD AUTO: 1.39 K/UL (ref 0–0.85)
MONOCYTES NFR BLD AUTO: 10.5 % (ref 0–13.4)
NEUTROPHILS # BLD AUTO: 10.78 K/UL (ref 1.82–7.42)
NEUTROPHILS NFR BLD: 81.4 % (ref 44–72)
NRBC # BLD AUTO: 0 K/UL
NRBC BLD AUTO-RTO: 0 /100 WBC
PLATELET # BLD AUTO: 206 K/UL (ref 164–446)
PMV BLD AUTO: 10.5 FL (ref 9–12.9)
POTASSIUM SERPL-SCNC: 4.3 MMOL/L (ref 3.6–5.5)
RBC # BLD AUTO: 5.05 M/UL (ref 4.7–6.1)
SODIUM SERPL-SCNC: 140 MMOL/L (ref 135–145)
WBC # BLD AUTO: 13.2 K/UL (ref 4.8–10.8)

## 2017-06-24 PROCEDURE — 92610 EVALUATE SWALLOWING FUNCTION: CPT

## 2017-06-24 PROCEDURE — 770006 HCHG ROOM/CARE - MED/SURG/GYN SEMI*

## 2017-06-24 PROCEDURE — G8988 SELF CARE GOAL STATUS: HCPCS | Mod: CJ

## 2017-06-24 PROCEDURE — G8979 MOBILITY GOAL STATUS: HCPCS | Mod: CJ

## 2017-06-24 PROCEDURE — G8997 SWALLOW GOAL STATUS: HCPCS | Mod: CH

## 2017-06-24 PROCEDURE — 80048 BASIC METABOLIC PNL TOTAL CA: CPT

## 2017-06-24 PROCEDURE — A9270 NON-COVERED ITEM OR SERVICE: HCPCS | Performed by: INTERNAL MEDICINE

## 2017-06-24 PROCEDURE — G8978 MOBILITY CURRENT STATUS: HCPCS | Mod: CL

## 2017-06-24 PROCEDURE — 700102 HCHG RX REV CODE 250 W/ 637 OVERRIDE(OP): Performed by: INTERNAL MEDICINE

## 2017-06-24 PROCEDURE — 97161 PT EVAL LOW COMPLEX 20 MIN: CPT

## 2017-06-24 PROCEDURE — 99232 SBSQ HOSP IP/OBS MODERATE 35: CPT | Performed by: HOSPITALIST

## 2017-06-24 PROCEDURE — 36415 COLL VENOUS BLD VENIPUNCTURE: CPT

## 2017-06-24 PROCEDURE — G8996 SWALLOW CURRENT STATUS: HCPCS | Mod: CJ

## 2017-06-24 PROCEDURE — G8987 SELF CARE CURRENT STATUS: HCPCS | Mod: CL

## 2017-06-24 PROCEDURE — 85025 COMPLETE CBC W/AUTO DIFF WBC: CPT

## 2017-06-24 PROCEDURE — 97166 OT EVAL MOD COMPLEX 45 MIN: CPT

## 2017-06-24 RX ADMIN — OXYCODONE HYDROCHLORIDE 5 MG: 5 TABLET ORAL at 21:38

## 2017-06-24 RX ADMIN — VITAMIN D, TAB 1000IU (100/BT) 1000 UNITS: 25 TAB at 09:22

## 2017-06-24 RX ADMIN — PRAZOSIN HYDROCHLORIDE 1 MG: 1 CAPSULE ORAL at 21:34

## 2017-06-24 RX ADMIN — CARBIDOPA AND LEVODOPA 1 TABLET: 50; 200 TABLET, EXTENDED RELEASE ORAL at 21:34

## 2017-06-24 RX ADMIN — LEVOTHYROXINE SODIUM 75 MCG: 75 TABLET ORAL at 17:21

## 2017-06-24 RX ADMIN — CARBIDOPA AND LEVODOPA 1 TABLET: 25; 100 TABLET ORAL at 09:22

## 2017-06-24 RX ADMIN — AMLODIPINE BESYLATE 5 MG: 5 TABLET ORAL at 17:21

## 2017-06-24 RX ADMIN — CARBIDOPA AND LEVODOPA 1 TABLET: 25; 100 TABLET ORAL at 21:34

## 2017-06-24 RX ADMIN — PRAVASTATIN SODIUM 20 MG: 10 TABLET ORAL at 21:34

## 2017-06-24 RX ADMIN — ASPIRIN 81 MG: 81 TABLET ORAL at 09:21

## 2017-06-24 RX ADMIN — POLYETHYLENE GLYCOL 3350 1 PACKET: 17 POWDER, FOR SOLUTION ORAL at 21:35

## 2017-06-24 ASSESSMENT — ENCOUNTER SYMPTOMS
SORE THROAT: 0
HEADACHES: 0
PND: 0
BLURRED VISION: 0
SPUTUM PRODUCTION: 0
TREMORS: 0
NAUSEA: 0
TINGLING: 0
MYALGIAS: 1
CONSTIPATION: 0
CLAUDICATION: 0
CHILLS: 0
SENSORY CHANGE: 0
BACK PAIN: 1
SHORTNESS OF BREATH: 0
NERVOUS/ANXIOUS: 0
VOMITING: 0
PALPITATIONS: 0
STRIDOR: 0
HEARTBURN: 0
BLOOD IN STOOL: 0
MEMORY LOSS: 0
DEPRESSION: 0
DOUBLE VISION: 0
FEVER: 0
PHOTOPHOBIA: 0
SPEECH CHANGE: 0
ORTHOPNEA: 0
HEMOPTYSIS: 0
COUGH: 0
DIZZINESS: 0
WEAKNESS: 1
EYE PAIN: 0

## 2017-06-24 ASSESSMENT — COGNITIVE AND FUNCTIONAL STATUS - GENERAL
PERSONAL GROOMING: A LOT
SUGGESTED CMS G CODE MODIFIER DAILY ACTIVITY: CL
MOVING FROM LYING ON BACK TO SITTING ON SIDE OF FLAT BED: A LITTLE
MOBILITY SCORE: 14
TURNING FROM BACK TO SIDE WHILE IN FLAT BAD: A LITTLE
HELP NEEDED FOR BATHING: A LOT
DRESSING REGULAR UPPER BODY CLOTHING: A LOT
DRESSING REGULAR LOWER BODY CLOTHING: A LOT
MOVING TO AND FROM BED TO CHAIR: A LOT
SUGGESTED CMS G CODE MODIFIER MOBILITY: CL
EATING MEALS: A LITTLE
STANDING UP FROM CHAIR USING ARMS: A LOT
CLIMB 3 TO 5 STEPS WITH RAILING: TOTAL
TOILETING: A LOT
DAILY ACTIVITIY SCORE: 13
WALKING IN HOSPITAL ROOM: A LITTLE

## 2017-06-24 ASSESSMENT — GAIT ASSESSMENTS
GAIT LEVEL OF ASSIST: CONTACT GUARD ASSIST
DISTANCE (FEET): 10
DEVIATION: STEP TO;DECREASED BASE OF SUPPORT
ASSISTIVE DEVICE: 4 WHEEL WALKER

## 2017-06-24 ASSESSMENT — PAIN SCALES - GENERAL
PAINLEVEL_OUTOF10: 5
PAINLEVEL_OUTOF10: 0
PAINLEVEL_OUTOF10: 0
PAINLEVEL_OUTOF10: 4
PAINLEVEL_OUTOF10: 0

## 2017-06-24 ASSESSMENT — ACTIVITIES OF DAILY LIVING (ADL): TOILETING: INDEPENDENT

## 2017-06-24 NOTE — THERAPY
"Physical Therapy Evaluation completed.   Bed Mobility:  Supine to Sit: Moderate Assist  Transfers: Sit to Stand: Moderate Assist  Gait: Level Of Assist: Contact Guard Assist with 4-Wheel Walker       Plan of Care: Will benefit from Physical Therapy 4 times per week  Discharge Recommendations: Equipment: 4-Wheel Walker and Will Continue to Assess for Equipment Needs. Post-acute therapy Discharge to a transitional care facility for continued skilled therapy services.    See \"Rehab Therapy-Acute\" Patient Summary Report for complete documentation.       Pt is an 84 yo M with history of Parkinson's disease presents with L1 burst fracture.  Patient requires mod A for bed mobility, CGA for amb, and has marked decrease in tolerance for amb from baseline (reports he was able to walk 30min prior).  Patient lives in ground floor apartment with spouse and dog, no steps to enter.  Pt has 4WW at home.  Will continues to follow pt while in hospital, and pt would benefit from skilled therapy prior to returning home.   "

## 2017-06-24 NOTE — H&P
DATE OF SERVICE:  06/23/2017    ADMITTING ATTENDING:  Hunter Atwood MD    PRIMARY CARE PHYSICIAN:  Celso Steward MD    CONSULTANTS:  Neurosurgery, Chad Howe MD.    CHIEF COMPLAINT:  Ground level fall and back pain.    HISTORY OF PRESENTING ILLNESS:  This is an 85-year-old male with a past   medical history of Parkinson, osteopenia, arthritis, hypertension, who   presented to the ER after a ground level fall.  Patient states that he was   walking his dog when he lost balance and fell on his buttocks.  The patient   then developed severe back pain and it was difficult for him to get back up.    The patient denied any loss of consciousness, chest pain, shortness of breath,   lightheadedness, or seizure-like activity.  Patient denies any previous   falls.  Patient is not on any blood thinners.  At this time, the patient   denies any weakness of his limbs, urinary or bowel incontinence.  He denies   any numbness or tingling.  At this time, the patient reports significant back   pain.    REVIEW OF SYSTEMS:  Please see HPI, all other systems were reviewed and are   negative per AMA and CMS criteria.    PAST MEDICAL HISTORY:  Hypertension, hypothyroidism, dyslipidemia, frequent   urination at night, polycythemia, arthritis, Parkinson disease, osteopenia.    PAST SURGICAL HISTORY:  Inguinal hernia repair x2, tonsillectomy.    CURRENT OUTPATIENT MEDICATIONS:  V[place]a-Colace 8.6/50 one tab daily as needed,   vitamin D 1000 units daily, Fosamax 35 mg every Sunday, Synthroid 75 mcg every   day, amlodipine 5 mg every day, prazosin 1 mg every evening, Pravachol 20 mg   every evening, Sinemet-CR  mg every bedtime, Sinemet  mg tab 1 tab   b.i.d., fish oil, aspirin 81 mg daily.    MEDICATION ALLERGIES:  INFLUENZA VACCINE.    FAMILY HISTORY:  Mother had heart disease and stroke.    SOCIAL HISTORY:  Patient is a never smoker.  Denies alcohol or illicit drug   use.    PHYSICAL EXAMINATION:  VITAL SIGNS:  BMI is 23.  Blood  pressure is 137/77, pulse is 82, temperature   is 99.3, respiratory rate is 17, oxygen saturation is 95% on 2 liters of nasal   cannula.  CONSTITUTIONAL:  An elderly male who is in pain.  HENMT:  Normocephalic, atraumatic.  Oral mucous membranes are moist.  Eyes,   PERRLA. Extraocular muscles intact.  Conjunctivae normal.  NECK:  Supple without lymphadenopathy.  CARDIOVASCULAR:  Normal heart rate, normal rhythm.  No murmurs, cyanosis or   edema.  LUNGS:  Respiratory effort is normal.  Breath sounds are clear to auscultation   bilaterally.  No rales, rhonchi, or wheezing.  ABDOMEN:  Soft, nontender.  No guarding or rebound.  EXTREMITIES:  Pulses are intact.  No cyanosis or edema.  SKIN:  Warm, dry without erythema or rash.  NEUROLOGIC:  Alert and oriented to time, place, and person.  Strength and   sensation is intact bilaterally.  No focal deficits noted.  Cranial nerves   II-XII normal.  MUSCULOSKELETAL:  Straight leg raise is negative bilaterally.  Lumbar spine   point tenderness noted.    PERTINENT LABORATORY DATA REVIEW:  Magnesium is 2.3.  CBC and CMP are pending.    IMAGING REVIEW:  CT spine reveals acute comminuted burst fracture of L1 with   minimal retropulsed fragment.    ASSESSMENT AND PLAN:  1.  Acute burst fracture of L1 -- patient will be admitted to the surgical   unit with management that is expected to take greater than 2 midnights.  The   patient has been evaluated by neurosurgery.  A thoracic-lumbo-sacral orthosis   brace has been ordered, which the patient is recommended to wear when out of   bed.  We will also order physical therapy and occupational therapy.  Patient   will be provided with IV morphine for intractable back pain and we will   monitor his respiratory status closely.  Patient will also be provided   oxycodone for pain p.r.n.  2.  Intractable back pain as above.  3.  Parkinson, this is stable.  Patient will be continued on his Sinemet.  4.  Hypothyroidism.  We will check a TSH and  continue the patient on Synthroid   75.  5.  Hypertension.  The patient's blood pressure is well controlled.  We will   continue him on amlodipine.  6.  Osteopenia.  We will continue the patient on alendronate and vitamin D.  7.  Deep venous thrombosis prophylaxis with sequential compression devices.  8.  Code status:  Do not resuscitate.       ____________________________________     MD TESSIE Sanders / MARCO    DD:  06/23/2017 17:25:29  DT:  06/23/2017 19:30:22    D#:  [id number]  Job#:  [id number]

## 2017-06-24 NOTE — CONSULTS
DATE OF SERVICE:  06/23/2017    DATE OF CONSULTATION:  06/23/2017    CONSULTING PHYSICIAN:  Chad Howe MD.    REQUESTING PHYSICIAN:  Mary Garza MD.    REASON FOR CONSULTATION:  L1 burst fracture.    HISTORY OF PRESENT ILLNESS:  This is an 85-year-old male with past medical   history of Parkinson's disease who lives with his wife at a senior care   center, fell on his sacral region on 06/22/2017 while walking his dog and lost   his balance.  He had immediate onset of back pain although he was still   ambulatory.  He came to the hospital because he was sick to his stomach and   his back was hurting significantly.  He denies weakness, numbness, tingling or   any other neurologic symptoms.    REVIEW OF SYSTEMS:  As stated above, otherwise negative.    PAST MEDICAL HISTORY:  1.  Parkinson's.  2.  Polycythemia.  3.  Arthritis.  4.  Osteopenia.  5.  Hypertension.  6.  Hypothyroidism.  7.  Dyslipidemia.    SOCIAL HISTORY:  He denies alcohol, tobacco or illicit drug use.  He lives   with his wife in a senior care facility.    PAST SURGICAL HISTORY:  1.  Hernia.  2.  Tonsillectomy.  3.  Cataract.    HOME MEDICATIONS:  As noted in the nursing records.    ALLERGIES:  INFLUENZA VACCINE.    PHYSICAL EXAMINATION:  VITAL SIGNS:  Afebrile.  Vital signs stable.  GENERAL:  No acute distress, lying in bed.  HEENT:  Normocephalic, atraumatic.  Pupils equal and reactive to light.    Extraocular movements intact.  NEUROLOGIC:  Cranial nerves II-XII are intact.  Bilateral lower extremity   strength 5/5.  Bilateral upper extremity strength 5/5.  No focal sensory   deficits noted.    IMAGING:  A noncontrast CT of the lumbar spine shows an acute L1 burst   fracture with mild retropulsion and approximately 30-40% loss of height.    There is diffuse osteopenia present.    Upright x-rays in TLSO show unchanged L1 compression fracture height.    LABORATORY DATA:  Full review of labs have been performed in Epic.    ASSESSMENT:  1.   Fall.  2.  L1 burst fracture, 30-40% loss of height, minimal retropulsion, close,   initial.  3.  Acute back pain.    PLAN:  1.  I have reviewed the patient's images, discussed with him and his wife that   his fracture appears stable in his brace.  2.  I have recommended him and his wife that he wear the brace for 3 months   when out of bed or out of his chair.  It sounds that he is generally in a   motorized wheelchair and he does not need to wear his brace while he is in   that.  3.  I would like him to follow up in my clinic in approximately 3 months with   AP and lateral x-rays of his lumbar spine at which time we would hope to   remove his brace.  4.  Patient's diffuse osteopenia, I would not recommend vertebroplasty as the   vertebral bodies above and below tend to undergo compression fractures with   subsequent very minor trauma due to the very hard cement that is juxtaposed to   soft bone.    I am available if he needs any other recommendations or management.       ____________________________________     Chad Howe MD SA / MARCO    DD:  06/23/2017 18:16:56  DT:  06/23/2017 20:50:20    D#:  [id number]  Job#:  [id number]

## 2017-06-24 NOTE — PROGRESS NOTES
[facility] Hospitalist Progress Note    Date of Service: 2017    Chief Complaint  85 y.o. male admitted 2017 with Acute burst fracture of L1 as a result of a ground level fall.     Interval Problem Update  No acute issues, his pain is under control. Neurosurgery recommended wearing back brace for 3 months.  PT/OT today.  SLP following on dysphagia diet.  Pain control    Consultants/Specialty  neurosurgery    Disposition  TBD        Review of Systems   Constitutional: Positive for malaise/fatigue. Negative for fever and chills.   HENT: Negative for congestion, hearing loss, sore throat and tinnitus.    Eyes: Negative for blurred vision, double vision, photophobia and pain.   Respiratory: Negative for cough, hemoptysis, sputum production, shortness of breath and stridor.    Cardiovascular: Negative for chest pain, palpitations, orthopnea, claudication and PND.   Gastrointestinal: Negative for heartburn, nausea, vomiting, constipation, blood in stool and melena.   Genitourinary: Negative for dysuria, urgency and frequency.   Musculoskeletal: Positive for myalgias and back pain.   Neurological: Positive for weakness. Negative for dizziness, tingling, tremors, sensory change, speech change and headaches.   Psychiatric/Behavioral: Negative for depression, suicidal ideas and memory loss. The patient is not nervous/anxious.       Physical Exam  Laboratory/Imaging   Hemodynamics  Temp (24hrs), Av °C (98.6 °F), Min:36.7 °C (98 °F), Max:37.4 °C (99.3 °F)   Temperature: 36.7 °C (98 °F)  Pulse  Av.8  Min: 79  Max: 96    Blood Pressure : 112/59 mmHg, NIBP: 114/49 mmHg      Respiratory      Respiration: 17, Pulse Oximetry: 92 %     Work Of Breathing / Effort: Mild  RUL Breath Sounds: Diminished, RML Breath Sounds: Diminished, RLL Breath Sounds: Diminished, DARREL Breath Sounds: Diminished, LLL Breath Sounds: Diminished    Fluids    Intake/Output Summary (Last 24 hours) at 17 3004  Last data filed at 17  0400   Gross per 24 hour   Intake      0 ml   Output    200 ml   Net   -200 ml       Nutrition  Orders Placed This Encounter   Procedures   • DIET ORDER     Standing Status: Standing      Number of Occurrences: 1      Standing Expiration Date:      Order Specific Question:  Diet:     Answer:  Regular [1]     Order Specific Question:  Texture/Fiber modifications:     Answer:  Dysphagia 2(Pureed/Chopped)specify fluid consistency(question 6) [2]     Order Specific Question:  Consistency/Fluid modifications:     Answer:  Nectar Thick [2]     Physical Exam   Constitutional: He is oriented to person, place, and time. He appears well-developed and well-nourished.   HENT:   Head: Normocephalic and atraumatic.   Mouth/Throat: No oropharyngeal exudate.   Eyes: Conjunctivae are normal. Pupils are equal, round, and reactive to light. Right eye exhibits no discharge. No scleral icterus.   Neck: Neck supple. No JVD present. No thyromegaly present.   Cardiovascular: Intact distal pulses.    No murmur heard.  Pulmonary/Chest: Effort normal and breath sounds normal. No stridor. No respiratory distress. He has no wheezes. He has no rales.   Abdominal: Soft. Bowel sounds are normal. He exhibits no distension. There is no tenderness. There is no rebound.   Musculoskeletal: Normal range of motion. He exhibits no edema.   Neurological: He is alert and oriented to person, place, and time. No cranial nerve deficit.   Resting tremor more pronounced right hand    Skin: Skin is warm. No erythema.   Psychiatric: He has a normal mood and affect. His behavior is normal. Thought content normal.       Recent Labs      06/24/17   0317   WBC  13.2*   RBC  5.05   HEMOGLOBIN  16.3   HEMATOCRIT  48.2   MCV  95.4   MCH  32.3   MCHC  33.8   RDW  45.9   PLATELETCT  206   MPV  10.5     Recent Labs      06/24/17   0317   SODIUM  140   POTASSIUM  4.3   CHLORIDE  104   CO2  23   GLUCOSE  111*   BUN  34*   CREATININE  1.37   CALCIUM  9.3                       Assessment/Plan     Dyslipidemia (present on admission)  Assessment & Plan  Continue pravastatin    Parkinson's disease (CMS-HCC) (present on admission)  Assessment & Plan  Continue sinemet.     Essential hypertension (present on admission)  Assessment & Plan  Continue norvasc, controlled     Hypothyroidism (present on admission)  Assessment & Plan  Continue synthroid.    Burst fracture of lumbar vertebra (CMS-HCC) (present on admission)  Assessment & Plan  L1 burst fracture, 30-40% loss of height, minimal retropulsion, close,    Initial.  Neurosurgery recommended wearing back brace for 3 months  Given patient's diffuse osteopenia, vertebroplasty would not be recommended.  PT/OT today  Pain control     Patient plan of care discussed at multidisplinary team rounds and with patient and R.N at beside.    Labs reviewed, Medications reviewed and Radiology images reviewed  Dumont catheter: No Dumont        DVT prophylaxis - mechanical: SCDs      Assessed for rehab: Patient was assess for and/or received rehabilitation services during this hospitalization

## 2017-06-24 NOTE — PROGRESS NOTES
Patient aox4. NPO for Speech. Pain meds given. Rested well throughout shift. Plan of care ongoing.

## 2017-06-24 NOTE — PROGRESS NOTES
Received bedside shift report from night RN. Pt AOx4.  Pt reports pain is 0/10. Does call appropriately. Bed alarm is on.  Pt is sitting in bed, speech is working with him on a swallow evaluation. PIV assessed and is patent. Pt is on RA. POC discussed as well as unit routine, comfort, and safety. Dicussed DC planning to home is what the pt wants, PT and OT are awaiting evaluation. Discussed the goal for today, speech, PT and OT evaluation . Reviewed orders, notes, labs, and test results. Hourly rounding in place with RN rounding on odd hours and CNA on even hours.

## 2017-06-24 NOTE — THERAPY
"Occupational Therapy Evaluation completed.   Functional Status:  Moderate functional deficit observed. Motivated for independence and return home  Plan of Care: 3 x weekly to focus on ADLS, transfers, Back precautions with task completion  Discharge Recommendations:  Equipment: TBD. Post-acute therapy - yes    See \"Rehab Therapy-Acute\" Patient Summary Report for complete documentation.    "

## 2017-06-24 NOTE — PROGRESS NOTES
Report received. Assumed care of pt. A/O x4. VSS. Responds appropriately. C/o of pain, medicated per MAR. Assessment complete LSO brace on. Discussed POC,safety, pain control, pt verbalizes understanding. Explained importance of calling before getting OOB. Call light and belongings within reach. Bed alarm on. Bed in the lowest position. Treaded socks in place. Hourly rounding in progress. Will continue to monitor .

## 2017-06-24 NOTE — CARE PLAN
Problem: Pain Management  Goal: Pain level will decrease to patient’s comfort goal  Outcome: PROGRESSING AS EXPECTED  Pt denies any pain at this time    Problem: Mobility  Goal: Risk for activity intolerance will decrease  Outcome: PROGRESSING AS EXPECTED  PT and OT has evaluated pt. Suggesting SNF

## 2017-06-24 NOTE — ASSESSMENT & PLAN NOTE
L1 burst fracture, 30-40% loss of height, minimal retropulsion, close,    Initial.  Neurosurgery recommended wearing back brace for 3 months  Given patient's diffuse osteopenia, vertebroplasty would not be recommended.  PT/OT today  Pain control

## 2017-06-25 LAB
ALBUMIN SERPL BCP-MCNC: 4 G/DL (ref 3.2–4.9)
ALBUMIN/GLOB SERPL: 1.5 G/DL
ALP SERPL-CCNC: 54 U/L (ref 30–99)
ALT SERPL-CCNC: 7 U/L (ref 2–50)
ANION GAP SERPL CALC-SCNC: 9 MMOL/L (ref 0–11.9)
AST SERPL-CCNC: 30 U/L (ref 12–45)
BASOPHILS # BLD AUTO: 0.2 % (ref 0–1.8)
BASOPHILS # BLD: 0.03 K/UL (ref 0–0.12)
BILIRUB SERPL-MCNC: 0.9 MG/DL (ref 0.1–1.5)
BUN SERPL-MCNC: 40 MG/DL (ref 8–22)
CALCIUM SERPL-MCNC: 9.3 MG/DL (ref 8.5–10.5)
CHLORIDE SERPL-SCNC: 104 MMOL/L (ref 96–112)
CO2 SERPL-SCNC: 26 MMOL/L (ref 20–33)
CREAT SERPL-MCNC: 1.18 MG/DL (ref 0.5–1.4)
EOSINOPHIL # BLD AUTO: 0.04 K/UL (ref 0–0.51)
EOSINOPHIL NFR BLD: 0.3 % (ref 0–6.9)
ERYTHROCYTE [DISTWIDTH] IN BLOOD BY AUTOMATED COUNT: 44.2 FL (ref 35.9–50)
GFR SERPL CREATININE-BSD FRML MDRD: 59 ML/MIN/1.73 M 2
GLOBULIN SER CALC-MCNC: 2.7 G/DL (ref 1.9–3.5)
GLUCOSE SERPL-MCNC: 133 MG/DL (ref 65–99)
HCT VFR BLD AUTO: 47.2 % (ref 42–52)
HGB BLD-MCNC: 16 G/DL (ref 14–18)
IMM GRANULOCYTES # BLD AUTO: 0.04 K/UL (ref 0–0.11)
IMM GRANULOCYTES NFR BLD AUTO: 0.3 % (ref 0–0.9)
LYMPHOCYTES # BLD AUTO: 0.96 K/UL (ref 1–4.8)
LYMPHOCYTES NFR BLD: 7.5 % (ref 22–41)
MAGNESIUM SERPL-MCNC: 2.2 MG/DL (ref 1.5–2.5)
MCH RBC QN AUTO: 32.1 PG (ref 27–33)
MCHC RBC AUTO-ENTMCNC: 33.9 G/DL (ref 33.7–35.3)
MCV RBC AUTO: 94.6 FL (ref 81.4–97.8)
MONOCYTES # BLD AUTO: 1.44 K/UL (ref 0–0.85)
MONOCYTES NFR BLD AUTO: 11.3 % (ref 0–13.4)
NEUTROPHILS # BLD AUTO: 10.28 K/UL (ref 1.82–7.42)
NEUTROPHILS NFR BLD: 80.4 % (ref 44–72)
NRBC # BLD AUTO: 0 K/UL
NRBC BLD AUTO-RTO: 0 /100 WBC
PLATELET # BLD AUTO: 190 K/UL (ref 164–446)
PMV BLD AUTO: 10.5 FL (ref 9–12.9)
POTASSIUM SERPL-SCNC: 4 MMOL/L (ref 3.6–5.5)
PROT SERPL-MCNC: 6.7 G/DL (ref 6–8.2)
RBC # BLD AUTO: 4.99 M/UL (ref 4.7–6.1)
SODIUM SERPL-SCNC: 139 MMOL/L (ref 135–145)
WBC # BLD AUTO: 12.8 K/UL (ref 4.8–10.8)

## 2017-06-25 PROCEDURE — A9270 NON-COVERED ITEM OR SERVICE: HCPCS | Performed by: INTERNAL MEDICINE

## 2017-06-25 PROCEDURE — 99232 SBSQ HOSP IP/OBS MODERATE 35: CPT | Performed by: HOSPITALIST

## 2017-06-25 PROCEDURE — 36415 COLL VENOUS BLD VENIPUNCTURE: CPT

## 2017-06-25 PROCEDURE — 85025 COMPLETE CBC W/AUTO DIFF WBC: CPT

## 2017-06-25 PROCEDURE — 700102 HCHG RX REV CODE 250 W/ 637 OVERRIDE(OP): Performed by: INTERNAL MEDICINE

## 2017-06-25 PROCEDURE — 83735 ASSAY OF MAGNESIUM: CPT

## 2017-06-25 PROCEDURE — 80053 COMPREHEN METABOLIC PANEL: CPT

## 2017-06-25 PROCEDURE — 770006 HCHG ROOM/CARE - MED/SURG/GYN SEMI*

## 2017-06-25 RX ADMIN — OXYCODONE HYDROCHLORIDE 10 MG: 10 TABLET ORAL at 22:25

## 2017-06-25 RX ADMIN — PRAVASTATIN SODIUM 20 MG: 10 TABLET ORAL at 22:25

## 2017-06-25 RX ADMIN — VITAMIN D, TAB 1000IU (100/BT) 1000 UNITS: 25 TAB at 09:42

## 2017-06-25 RX ADMIN — CARBIDOPA AND LEVODOPA 1 TABLET: 25; 100 TABLET ORAL at 11:28

## 2017-06-25 RX ADMIN — OXYCODONE HYDROCHLORIDE 10 MG: 10 TABLET ORAL at 09:40

## 2017-06-25 RX ADMIN — CARBIDOPA AND LEVODOPA 1 TABLET: 50; 200 TABLET, EXTENDED RELEASE ORAL at 22:25

## 2017-06-25 RX ADMIN — BISACODYL 10 MG: 10 SUPPOSITORY RECTAL at 09:44

## 2017-06-25 RX ADMIN — ASPIRIN 81 MG: 81 TABLET ORAL at 09:42

## 2017-06-25 RX ADMIN — CARBIDOPA AND LEVODOPA 1 TABLET: 25; 100 TABLET ORAL at 16:21

## 2017-06-25 RX ADMIN — AMLODIPINE BESYLATE 5 MG: 5 TABLET ORAL at 16:20

## 2017-06-25 RX ADMIN — LEVOTHYROXINE SODIUM 75 MCG: 75 TABLET ORAL at 16:20

## 2017-06-25 RX ADMIN — OXYCODONE HYDROCHLORIDE 5 MG: 5 TABLET ORAL at 05:45

## 2017-06-25 RX ADMIN — OXYCODONE HYDROCHLORIDE 10 MG: 10 TABLET ORAL at 16:25

## 2017-06-25 RX ADMIN — ALENDRONATE SODIUM 35 MG: 10 TABLET ORAL at 09:42

## 2017-06-25 RX ADMIN — MAGNESIUM HYDROXIDE 30 ML: 400 SUSPENSION ORAL at 11:32

## 2017-06-25 RX ADMIN — PRAZOSIN HYDROCHLORIDE 1 MG: 1 CAPSULE ORAL at 22:30

## 2017-06-25 ASSESSMENT — ENCOUNTER SYMPTOMS
COUGH: 0
DEPRESSION: 0
CONSTIPATION: 0
PND: 0
BLURRED VISION: 0
BACK PAIN: 1
CHILLS: 0
NAUSEA: 0
SPEECH CHANGE: 0
BLOOD IN STOOL: 0
SENSORY CHANGE: 0
EYE PAIN: 0
TINGLING: 0
NERVOUS/ANXIOUS: 0
HEADACHES: 0
HEMOPTYSIS: 0
SORE THROAT: 0
SPUTUM PRODUCTION: 0
MYALGIAS: 1
PHOTOPHOBIA: 0
FEVER: 0
WEAKNESS: 1
PALPITATIONS: 0
ORTHOPNEA: 0
MEMORY LOSS: 0
SHORTNESS OF BREATH: 0
DIZZINESS: 0
DOUBLE VISION: 0
TREMORS: 0
STRIDOR: 0
HEARTBURN: 0
CLAUDICATION: 0
VOMITING: 0

## 2017-06-25 ASSESSMENT — PAIN SCALES - GENERAL
PAINLEVEL_OUTOF10: 5
PAINLEVEL_OUTOF10: 8
PAINLEVEL_OUTOF10: 6
PAINLEVEL_OUTOF10: 7
PAINLEVEL_OUTOF10: 4
PAINLEVEL_OUTOF10: 7
PAINLEVEL_OUTOF10: 7

## 2017-06-25 NOTE — CARE PLAN
Received report from day RN, assumed care at 1915; Pt A&Ox4, slying on bed; Pt pain is 5-6/10 on his back; CMS intact; Pt is up w 1-2 assist, 4WW; PIV  Rt AC assessed and is patent, CDI, SL; Pt is on 1L NC w SaO2 >90%; Call light and personal possessions within reach, discussed safety interventions w pt; Reviewed recent notes, labs, diagnostics, MD orders; POC discussed      Problem: Safety  Goal: Will remain free from injury  Outcome: PROGRESSING AS EXPECTED  Place pt call light and belongings within reached; Instructed to call for assistance, Risk for fall education implemented; Non-skid socks on; Bed alarm is ON;  Bed lowered and locked, upper siderails up; Hourly rounding in place      Problem: Venous Thromboembolism (VTW)/Deep Vein Thrombosis (DVT) Prevention:  Goal: Patient will participate in Venous Thrombosis (VTE)/Deep Vein Thrombosis (DVT)Prevention Measures  Outcome: PROGRESSING AS EXPECTED  SCD's On. ASA scheduled      Problem: Bowel/Gastric:  Goal: Normal bowel function is maintained or improved  Outcome: PROGRESSING AS EXPECTED  Pt Last BM was on 6/21. Miralax given      Problem: Pain Management  Goal: Pain level will decrease to patient’s comfort goal  Outcome: PROGRESSING AS EXPECTED  Medicated per MAR.      Problem: Discharge Barriers/Planning  Goal: Patient’s continuum of care needs will be met  Outcome: PROGRESSING AS EXPECTED  SNF once cleared?

## 2017-06-25 NOTE — PROGRESS NOTES
[facility] Hospitalist Progress Note    Date of Service: 2017    Chief Complaint  85 y.o. male admitted 2017 with Acute burst fracture of L1 as a result of a ground level fall.     Interval Problem Update  No acute issues, his pain is under control. Neurosurgery recommended wearing back brace for 3 months.  PT/OT today.  SLP following on dysphagia diet.  Pain control      Patient reports uncontrolled back pain , is not moving much and reports the back brace being a hassle to place. When questioned on rehab he was hesitant but thinking about it  Cont to titrate medications for pain control  Consideration for SNF?    Consultants/Specialty  neurosurgery    Disposition  TBD        Review of Systems   Constitutional: Positive for malaise/fatigue. Negative for fever and chills.   HENT: Negative for congestion, hearing loss, sore throat and tinnitus.    Eyes: Negative for blurred vision, double vision, photophobia and pain.   Respiratory: Negative for cough, hemoptysis, sputum production, shortness of breath and stridor.    Cardiovascular: Negative for chest pain, palpitations, orthopnea, claudication and PND.   Gastrointestinal: Negative for heartburn, nausea, vomiting, constipation, blood in stool and melena.   Genitourinary: Negative for dysuria, urgency and frequency.   Musculoskeletal: Positive for myalgias and back pain.        Uncontrolled back pain   Neurological: Positive for weakness. Negative for dizziness, tingling, tremors, sensory change, speech change and headaches.   Psychiatric/Behavioral: Negative for depression, suicidal ideas and memory loss. The patient is not nervous/anxious.       Physical Exam  Laboratory/Imaging   Hemodynamics  Temp (24hrs), Av °C (98.6 °F), Min:36.7 °C (98.1 °F), Max:37.2 °C (99 °F)   Temperature: 36.7 °C (98.1 °F)  Pulse  Av.6  Min: 78  Max: 96    Blood Pressure : 134/68 mmHg      Respiratory      Respiration: 18, Pulse Oximetry: 90 %        RUL Breath Sounds:  Clear;Diminished, RML Breath Sounds: Clear;Diminished, RLL Breath Sounds: Diminished, DARREL Breath Sounds: Clear;Diminished, LLL Breath Sounds: Diminished    Fluids    Intake/Output Summary (Last 24 hours) at 06/25/17 1025  Last data filed at 06/25/17 1000   Gross per 24 hour   Intake    540 ml   Output    900 ml   Net   -360 ml       Nutrition  Orders Placed This Encounter   Procedures   • DIET ORDER     Standing Status: Standing      Number of Occurrences: 1      Standing Expiration Date:      Order Specific Question:  Diet:     Answer:  Regular [1]     Order Specific Question:  Texture/Fiber modifications:     Answer:  Dysphagia 2(Pureed/Chopped)specify fluid consistency(question 6) [2]     Order Specific Question:  Consistency/Fluid modifications:     Answer:  Nectar Thick [2]     Physical Exam   Constitutional: He is oriented to person, place, and time. He appears well-developed and well-nourished.   HENT:   Head: Normocephalic and atraumatic.   Mouth/Throat: No oropharyngeal exudate.   Eyes: Conjunctivae are normal. Pupils are equal, round, and reactive to light. Right eye exhibits no discharge. No scleral icterus.   Neck: Neck supple. No JVD present. No thyromegaly present.   Cardiovascular: Intact distal pulses.    No murmur heard.  Pulmonary/Chest: Effort normal and breath sounds normal. No stridor. No respiratory distress. He has no wheezes. He has no rales.   Abdominal: Soft. Bowel sounds are normal. He exhibits no distension. There is no tenderness. There is no rebound.   Musculoskeletal: Normal range of motion. He exhibits no edema.   Neurological: He is alert and oriented to person, place, and time. No cranial nerve deficit.   Resting tremor more pronounced right hand    Skin: Skin is warm. No erythema.   Psychiatric: He has a normal mood and affect. His behavior is normal. Thought content normal.       Recent Labs      06/24/17   0317  06/25/17   0034   WBC  13.2*  12.8*   RBC  5.05  4.99   HEMOGLOBIN   16.3  16.0   HEMATOCRIT  48.2  47.2   MCV  95.4  94.6   MCH  32.3  32.1   MCHC  33.8  33.9   RDW  45.9  44.2   PLATELETCT  206  190   MPV  10.5  10.5     Recent Labs      06/24/17   0317  06/25/17   0034   SODIUM  140  139   POTASSIUM  4.3  4.0   CHLORIDE  104  104   CO2  23  26   GLUCOSE  111*  133*   BUN  34*  40*   CREATININE  1.37  1.18   CALCIUM  9.3  9.3                      Assessment/Plan     Dyslipidemia (present on admission)  Assessment & Plan  Continue pravastatin    Parkinson's disease (CMS-HCC) (present on admission)  Assessment & Plan  Continue sinemet.     Essential hypertension (present on admission)  Assessment & Plan  Continue norvasc, controlled     Hypothyroidism (present on admission)  Assessment & Plan  Continue synthroid.    Burst fracture of lumbar vertebra (CMS-HCC) (present on admission)  Assessment & Plan  L1 burst fracture, 30-40% loss of height, minimal retropulsion, close,    Initial.  Neurosurgery recommended wearing back brace for 3 months  Given patient's diffuse osteopenia, vertebroplasty would not be recommended.  PT/OT recommends SNF, patient thinking about it  Titrate medications for adequate pain control       Patient plan of care discussed at multidisplinary team rounds and with patient and R.N at beside.    Labs reviewed, Medications reviewed and Radiology images reviewed  Dumont catheter: No Dumont        DVT prophylaxis - mechanical: SCDs      Assessed for rehab: Patient was assess for and/or received rehabilitation services during this hospitalization

## 2017-06-26 VITALS
SYSTOLIC BLOOD PRESSURE: 143 MMHG | TEMPERATURE: 97.2 F | HEART RATE: 87 BPM | WEIGHT: 160 LBS | BODY MASS INDEX: 23.7 KG/M2 | HEIGHT: 69 IN | OXYGEN SATURATION: 90 % | RESPIRATION RATE: 16 BRPM | DIASTOLIC BLOOD PRESSURE: 76 MMHG

## 2017-06-26 PROCEDURE — A9270 NON-COVERED ITEM OR SERVICE: HCPCS | Performed by: INTERNAL MEDICINE

## 2017-06-26 PROCEDURE — 99239 HOSP IP/OBS DSCHRG MGMT >30: CPT | Performed by: HOSPITALIST

## 2017-06-26 PROCEDURE — 700102 HCHG RX REV CODE 250 W/ 637 OVERRIDE(OP): Performed by: HOSPITALIST

## 2017-06-26 PROCEDURE — 97116 GAIT TRAINING THERAPY: CPT

## 2017-06-26 PROCEDURE — A9270 NON-COVERED ITEM OR SERVICE: HCPCS | Performed by: HOSPITALIST

## 2017-06-26 PROCEDURE — 97530 THERAPEUTIC ACTIVITIES: CPT

## 2017-06-26 PROCEDURE — 700102 HCHG RX REV CODE 250 W/ 637 OVERRIDE(OP): Performed by: INTERNAL MEDICINE

## 2017-06-26 PROCEDURE — 700105 HCHG RX REV CODE 258: Performed by: HOSPITALIST

## 2017-06-26 RX ORDER — AMOXICILLIN 250 MG
2 CAPSULE ORAL 2 TIMES DAILY PRN
Qty: 30 TAB | Refills: 0
Start: 2017-06-26

## 2017-06-26 RX ORDER — SODIUM CHLORIDE 9 MG/ML
INJECTION, SOLUTION INTRAVENOUS CONTINUOUS
Status: DISCONTINUED | OUTPATIENT
Start: 2017-06-26 | End: 2017-06-26 | Stop reason: HOSPADM

## 2017-06-26 RX ORDER — CALCIUM CARBONATE 500 MG/1
500 TABLET, CHEWABLE ORAL 3 TIMES DAILY PRN
Status: DISCONTINUED | OUTPATIENT
Start: 2017-06-26 | End: 2017-06-26 | Stop reason: HOSPADM

## 2017-06-26 RX ADMIN — AMLODIPINE BESYLATE 5 MG: 5 TABLET ORAL at 17:13

## 2017-06-26 RX ADMIN — VITAMIN D, TAB 1000IU (100/BT) 1000 UNITS: 25 TAB at 09:09

## 2017-06-26 RX ADMIN — OXYCODONE HYDROCHLORIDE 10 MG: 10 TABLET ORAL at 06:36

## 2017-06-26 RX ADMIN — ASPIRIN 81 MG: 81 TABLET ORAL at 09:09

## 2017-06-26 RX ADMIN — CARBIDOPA AND LEVODOPA 1 TABLET: 25; 100 TABLET ORAL at 14:39

## 2017-06-26 RX ADMIN — CARBIDOPA AND LEVODOPA 1 TABLET: 25; 100 TABLET ORAL at 06:36

## 2017-06-26 RX ADMIN — OXYCODONE HYDROCHLORIDE 10 MG: 10 TABLET ORAL at 10:32

## 2017-06-26 RX ADMIN — ANTACID TABLETS 500 MG: 500 TABLET, CHEWABLE ORAL at 13:05

## 2017-06-26 RX ADMIN — LEVOTHYROXINE SODIUM 75 MCG: 75 TABLET ORAL at 17:14

## 2017-06-26 RX ADMIN — SODIUM CHLORIDE: 9 INJECTION, SOLUTION INTRAVENOUS at 13:18

## 2017-06-26 ASSESSMENT — PAIN SCALES - GENERAL
PAINLEVEL_OUTOF10: 3
PAINLEVEL_OUTOF10: 5
PAINLEVEL_OUTOF10: 5
PAINLEVEL_OUTOF10: 4

## 2017-06-26 ASSESSMENT — COGNITIVE AND FUNCTIONAL STATUS - GENERAL
TURNING FROM BACK TO SIDE WHILE IN FLAT BAD: A LITTLE
MOVING TO AND FROM BED TO CHAIR: A LOT
STANDING UP FROM CHAIR USING ARMS: A LITTLE
WALKING IN HOSPITAL ROOM: A LITTLE
MOVING FROM LYING ON BACK TO SITTING ON SIDE OF FLAT BED: A LITTLE
MOBILITY SCORE: 16
CLIMB 3 TO 5 STEPS WITH RAILING: A LOT
SUGGESTED CMS G CODE MODIFIER MOBILITY: CK

## 2017-06-26 ASSESSMENT — GAIT ASSESSMENTS
ASSISTIVE DEVICE: 4 WHEEL WALKER
GAIT LEVEL OF ASSIST: CONTACT GUARD ASSIST
DISTANCE (FEET): 50

## 2017-06-26 ASSESSMENT — LIFESTYLE VARIABLES: EVER_SMOKED: NEVER

## 2017-06-26 NOTE — DISCHARGE PLANNING
Medical Social Work    SW received call from pt's spouse who reported that pt was accepted by [facility], but that [facility] never received referral. SW spoke with Laura from [facility] who confirmed that they had indeed received the SNF referral and pt was accepted.

## 2017-06-26 NOTE — DISCHARGE INSTRUCTIONS
Discharge Instructions    Discharged to [facility] by medical transportation with escort. Discharged via wheelchair, hospital escort: Yes.  Special equipment needed: Walker    Be sure to schedule a follow-up appointment with your primary care doctor or any specialists as instructed.     Discharge Plan:   Diet Plan: Discussed  Activity Level: Discussed  Confirmed Follow up Appointment: Patient to Call and Schedule Appointment  Confirmed Symptoms Management: Discussed  Medication Reconciliation Updated: Yes  Influenza Vaccine Indication: Patient Refuses    I understand that a diet low in cholesterol, fat, and sodium is recommended for good health. Unless I have been given specific instructions below for another diet, I accept this instruction as my diet prescription.   Other diet: Dysphagia 2 nector thick diet    Special Instructions: Discharge instructions for the Orthopedic Patient    Follow up with Primary Care Physician within 2 weeks of discharge to home, regarding:  Review of medications and diagnostic testing.  Surveillance for medical complications.  Workup and treatment of osteoporosis, if appropriate.     -Is this a Joint Replacement patient? No    -Is this patient being discharged with medication to prevent blood clots?  No    · Is patient discharged on Warfarin / Coumadin?   No     · Is patient Post Blood Transfusion?  No    Lumbar Fracture  A lumbar fracture is a break in one of the bones of the lower back. Lumbar fractures range in severity. Severe fractures can damage the spinal cord.  CAUSES  This condition may be caused by:  · A fall (common).  · A car accident (common).  · A gunshot wound.  · A hard, direct hit to the back.  · Osteoporosis.  SYMPTOMS  The main symptom of this condition is severe pain in the lower back. If a fracture is complex or severe, there may also be:  · A misshapen or swollen area on the lower back.  · A limited ability to move an area of the lower back.  · An inability to  empty the bladder or bowel.  · A loss of strength or sensation in the legs, feet, and toes.  · Paralysis.  DIAGNOSIS  This condition is diagnosed based on:  · A physical exam.  · Symptoms and what happened just before they developed.  · The results of imaging tests, such as an X-ray, CT scan, or MRI.  If your nerves have been damaged, you may also have other tests to find out how much damage there is.  TREATMENT  Treatment for this condition depends on the specifics of the injury. Most fractures can be treated with:  · A back brace.  · Bed rest and activity restrictions.  · Pain medicine.  · Physical therapy.  Fractures that are complex, involve multiple bones, or make the spine unstable may require surgery to remove pressure from the nerves or spinal cord and to stabilize the broken pieces of bone.  During recovery, it is normal to have pain and stiffness in the back for weeks.  HOME CARE INSTRUCTIONS  Medicines  · Take medicines only as directed by your health care provider.  · Do not drive or operate heavy machinery while taking pain medicine.   Activity  · Stay in bed for as long as directed by your health care provider.  · If you were shown how to do any exercises to improve motion and strength in your back, do them as directed by your health care provider.  · Return to your normal activities as directed by your health care provider. Ask your health care provider what activities are safe for you.  General Instructions  · If you were given a neck brace or back brace, wear it as directed by your health care provider.  · Keep all follow-up visits as directed by your health care provider. This is important. Failure to follow-up as recommended could result in permanent injury, disability, and long-lasting (chronic) pain.  SEEK MEDICAL CARE IF:  · Your pain does not improve over time.  · You have a persistent cough.  · You cannot return to your normal activities as planned or expected.  SEEK IMMEDIATE MEDICAL CARE  IF:  · You have severe pain or your pain suddenly gets worse.  · You are unable to move.  · You have numbness, tingling, weakness, or paralysis in any part of your body.  · You cannot control your bladder or bowel.  · You have difficulty breathing.  · You have a fever.  · You have pain in your chest or abdomen.  · You vomit.     This information is not intended to replace advice given to you by your health care provider. Make sure you discuss any questions you have with your health care provider.     Document Released: 04/03/2008 Document Revised: 05/03/2016 Document Reviewed: 12/14/2015  [employer] Interactive Patient Education ©2016 [employer]    Depression / Suicide Risk    As you are discharged from this [facility] facility, it is important to learn how to keep safe from harming yourself.    Recognize the warning signs:  · Abrupt changes in personality, positive or negative- including increase in energy   · Giving away possessions  · Change in eating patterns- significant weight changes-  positive or negative  · Change in sleeping patterns- unable to sleep or sleeping all the time   · Unwillingness or inability to communicate  · Depression  · Unusual sadness, discouragement and loneliness  · Talk of wanting to die  · Neglect of personal appearance   · Rebelliousness- reckless behavior  · Withdrawal from people/activities they love  · Confusion- inability to concentrate     If you or a loved one observes any of these behaviors or has concerns about self-harm, here's what you can do:  · Talk about it- your feelings and reasons for harming yourself  · Remove any means that you might use to hurt yourself (examples: pills, rope, extension cords, firearm)  · Get professional help from the community (Mental Health, Substance Abuse, psychological counseling)  · Do not be alone:Call your Safe Contact- someone whom you trust who will be there for you.  · Call your local CRISIS HOTLINE 372-8868 or 878-660-6015  · Call  your local Children's Mobile Crisis Response Team [place] (051) 881-3151 or www.[employer]  · Call the toll free National Suicide Prevention Hotlines   · National Suicide Prevention Lifeline 733-977-ZZJL (6417)  · National Hope Line Network 800-SUICIDE (462-5688)

## 2017-06-26 NOTE — DISCHARGE PLANNING
[facility] received SNF choice form. Per the choice form the referral has been sent to [facility]. Patient needs a SNF order

## 2017-06-26 NOTE — DISCHARGE SUMMARY
CHIEF COMPLAINT ON ADMISSION  Chief Complaint   Patient presents with   • T-5000 GLF       CODE STATUS  DNR    HPI & HOSPITAL COURSE  This is a 85 y.o. male here with Acute burst fracture of L1 as a result of a ground level fall. On admission patient undewent appropriate radiological studies which showed acute L1 burst fracture. Neurosurgery was consulted. Recommended  Wearing a brace for 3 months when out of bed or out of his chair. No surgical intervention was recommended at this time. 3 months with    AP and lateral x-rays of his lumbar spine was recommended. Hence at this point based on physical therapy evaluation patient would benefit from rehabilitation. Patient denies fevers/chills, chest pain, shortness of breath or nausea/vommiting.       Therefore, he is discharged in fair and stable condition with close outpatient follow-up.    SPECIFIC OUTPATIENT FOLLOW-UP  Neurosurgery    3 months with    AP and lateral x-rays of his lumbar spine    DISCHARGE PROBLEM LIST  Active Problems: stable      Dyslipidemia POA: Yes    Parkinson's disease (CMS-HCC) POA: Yes    Essential hypertension POA: Yes    Hypothyroidism POA: Yes    Burst fracture of lumbar vertebra (CMS-HCC) POA: Yes      Overview: 6/23/17       ER visit      L 1      FOLLOW UP  No future appointments.  Chad Howe M.D.  [address]  [address]  [place] [id number] [phone]    Schedule an appointment as soon as possible for a visit in 3 months      Celso Steward M.D.  [address]  [facility] [id number] [phone]    Schedule an appointment as soon as possible for a visit in 2 weeks        MEDICATIONS ON DISCHARGE   Issa Cuello   Home Medication Instructions NANDO:[id number]    Printed on:06/26/17 5545   Medication Information                      alendronate (FOSAMAX) 35 MG tablet  Take 35 mg by mouth every Sunday.             amlodipine (NORVASC) 5 MG Tab  TAKE ONE TABLET BY MOUTH EVERY DAY             aspirin EC (ECOTRIN)  81 MG TBEC  Take 81 mg by mouth every day.             Calcium Carbonate-Vitamin D (CALCIUM + D PO)  Take 1 Tab by mouth every day.             carbidopa-levodopa (SINEMET)  MG Tab  Take 1 Tab by mouth 2 Times a Day.             carbidopa-levodopa SR (SINEMET CR)  MG per tablet  Take 1 Tab by mouth every bedtime.             levothyroxine (SYNTHROID) 75 MCG Tab  TAKE ONE TABLET BY MOUTH EVERY DAY             Omega-3 Fatty Acids (FISH OIL PO)  Take 2 Tabs by mouth every day.             pravastatin (PRAVACHOL) 20 MG Tab  TAKE ONE TABLET BY MOUTH EVERY DAY EVENING             prazosin (MINIPRESS) 1 MG Cap  TAKE ONE CAPSULE BY MOUTH EVERY DAY EVENING             Red Yeast Rice Extract (RED YEAST RICE PO)  Take 1 Tab by mouth 2 Times a Day.             senna-docusate (PERICOLACE OR SENOKOT S) 8.6-50 MG Tab  Take 1 Tab by mouth 1 time daily as needed.             senna-docusate (PERICOLACE OR SENOKOT S) 8.6-50 MG Tab  Take 2 Tabs by mouth 2 times a day as needed for Constipation.             vitamin D (CHOLECALCIFEROL) 1000 UNIT Tab  Take 1,000 Units by mouth every day.                 DIET  Orders Placed This Encounter   Procedures   • DIET ORDER     Standing Status: Standing      Number of Occurrences: 1      Standing Expiration Date:      Order Specific Question:  Diet:     Answer:  Regular [1]     Order Specific Question:  Texture/Fiber modifications:     Answer:  Dysphagia 2(Pureed/Chopped)specify fluid consistency(question 6) [2]     Order Specific Question:  Consistency/Fluid modifications:     Answer:  Nectar Thick [2]       ACTIVITY  As tolerated.  Weight bearing as tolerated with back brace       CONSULTATIONS  Neurosurgery    PROCEDURES  None    LABORATORY  Lab Results   Component Value Date/Time    SODIUM 139 06/25/2017 12:34 AM    POTASSIUM 4.0 06/25/2017 12:34 AM    CHLORIDE 104 06/25/2017 12:34 AM    CO2 26 06/25/2017 12:34 AM    GLUCOSE 133* 06/25/2017 12:34 AM    BUN 40* 06/25/2017 12:34 AM     CREATININE 1.18 06/25/2017 12:34 AM        Lab Results   Component Value Date/Time    WBC 12.8* 06/25/2017 12:34 AM    HEMOGLOBIN 16.0 06/25/2017 12:34 AM    HEMATOCRIT 47.2 06/25/2017 12:34 AM    PLATELET COUNT 190 06/25/2017 12:34 AM        Total time of the discharge process exceeds 38 minutes

## 2017-06-26 NOTE — DISCHARGE PLANNING
Transportation has been arranged to transfer the patient today to [facility] at 1900 via the [facility] van. LENNY on floor Debbie has been notified. Transfer packet and cobra given to LENNY for MD signature.

## 2017-06-26 NOTE — CARE PLAN
Problem: Safety  Goal: Will remain free from injury  Outcome: PROGRESSING AS EXPECTED  Safety precautions in place. Call light within reach. Bed is low and in locked position. Hourly rounding in place. Bed alarm on.     Problem: Discharge Barriers/Planning  Goal: Patient’s continuum of care needs will be met  Outcome: PROGRESSING AS EXPECTED  In the anticipation of d/c planning. Pt is to got to [facility] today at 1900. Pt wife notified.

## 2017-06-26 NOTE — PROGRESS NOTES
Report received from the night nurse. Assumed care. Reviewed labs and medications. Pt is A&O x 4. Patient in bed resting.  Medicated per MAR  Refusing to put SCDs on. Education provided.  Safety precautions in place. Bed in lowered and locked position. Call light within reach. Updated on plan of care.

## 2017-06-26 NOTE — CARE PLAN
Received report from day RN, assumed care at 1915; Pt A&Ox4, slying on bed; Pt pain is 5-6/10 on his back; CMS intact; Pt is up w 1 assist, 4WW; PIV  Rt AC assessed and is patent, CDI, SL; Pt is on 1L NC w SaO2 >90%; Call light and personal possessions within reach, discussed safety interventions w pt; Reviewed recent notes, labs, diagnostics, MD orders; POC discussed      Problem: Safety  Goal: Will remain free from injury  Outcome: PROGRESSING AS EXPECTED  Place pt call light and belongings within reached; Instructed to call for assistance, Risk for fall education implemented; Non-skid socks on; Bed alarm is ON;  Bed lowered and locked, upper siderails up; Hourly rounding in place      Problem: Venous Thromboembolism (VTW)/Deep Vein Thrombosis (DVT) Prevention:  Goal: Patient will participate in Venous Thrombosis (VTE)/Deep Vein Thrombosis (DVT)Prevention Measures  Outcome: PROGRESSING AS EXPECTED  SCD's On. ASA scheduled      Problem: Pain Management  Goal: Pain level will decrease to patient’s comfort goal  Outcome: PROGRESSING AS EXPECTED  Medicated per MAR.      Problem: Discharge Barriers/Planning  Goal: Patient’s continuum of care needs will be met  Outcome: PROGRESSING AS EXPECTED  Pt prefer Home vs SNF, once cleared?

## 2017-06-26 NOTE — DISCHARGE PLANNING
Medical Social Work    SW completed COBRA and transfer packet for pt to go to [facility] today at 1900. SW met with pt at bedside and completed IMM and notified for transport. Pt agreeable to transfer, though indicated wife should be made aware. SW placed completed packet on pt's chart. SW called pt's wife, Alexandria, and confirmed above information. Alexandria denied needing contact info for [facility].

## 2017-06-26 NOTE — PROGRESS NOTES
Called pt wife to let her know that pt will be leaving today to [facility] at 1900. Pt wife verbalized understanding.

## 2017-06-26 NOTE — DISCHARGE PLANNING
Care Transition Team Assessment    IHD met with patient bedside. He stated he lives with his wife and she drives him for appointments. He stated he previously used a walker at home and has a back brace in the hospital. They previously had Meals on Wheels. Patient does not use any O2 or other services at home. He is scheduled to go to a SNF tonight.     Information Source  Orientation : Oriented x 4  Information Given By: Patient  Informant's Name: Issa  Who is responsible for making decisions for patient? : Patient         Elopement Risk  Legal Hold: No  Ambulatory or Self Mobile in Wheelchair: No-Not an Elopement Risk  Elopement Risk: Not at Risk for Elopement    Interdisciplinary Discharge Planning  Does Admitting Nurse Feel This Could be a Complex Discharge?: No  Primary Care Physician: Dr. Celso Steward  Lives with - Patient's Self Care Capacity: Spouse  Support Systems: Family Member(s)  Housing / Facility: 1 Story Apartment / Condo  Do You Take your Prescribed Medications Regularly: Yes  Able to Return to Previous ADL's: Future Time w/Therapy  Mobility Issues: Yes  Prior Services: Meals on Wheels  Patient Expects to be Discharged to:: SNF  Assistance Needed: No  Durable Medical Equipment: Walker, Other - Specify (Back brace)    Discharge Preparedness  What is your plan after discharge?: Skilled nursing facility  What are your discharge supports?: Spouse  Prior Functional Level: Ambulatory, Uses Walker, Independent with Activities of Daily Living, Needs Assist with Medication Management  Difficulity with ADLs: Walking  Difficulty with ADLs Comment: uses walker/back brace  Difficulity with IADLs: Driving, Cooking, Managing medication  Difficulity with IADL Comments: Does not drive, uses meals on wheels, wife manages meds    Functional Assesment  Prior Functional Level: Ambulatory, Uses Walker, Independent with Activities of Daily Living, Needs Assist with Medication Management    Finances  Financial Barriers to  Discharge: No  Prescription Coverage: Yes    Vision / Hearing Impairment  Vision Impairment : Yes  Right Eye Vision: Wears Glasses  Left Eye Vision: Wears Glasses  Hearing Impairment : No    Values / Beliefs / Concerns  Values / Beliefs Concerns : No         Domestic Abuse  Have you ever been the victim of abuse or violence?: No  Physical Abuse or Sexual Abuse: No  Verbal Abuse or Emotional Abuse: No  Possible Abuse Reported to:: Not Applicable    Psychological Assessment  History of Substance Abuse: None  History of Psychiatric Problems: No  Non-compliant with Treatment: No  Newly Diagnosed Illness: No    Discharge Risks or Barriers  Discharge risks or barriers?: No    Anticipated Discharge Information  Anticipated discharge disposition: Discharge needs currently unknown  Discharge Address: [address], [address], [facility] [address]  Discharge Contact Phone Number: [phone]

## 2017-06-26 NOTE — THERAPY
"Pt agreeable to tx today. Pt presents with functional limitations due to pain. Pt able to perform bed mboility @ a Pradip level. Pt sitting EOB balance is poor and pt requires assistance to maintain upright. Pt able to sit<->stand w/Pradip. Pt has a hisroty of Parkinson's Disease which affects pt mobility. pt able to amb further today, but required verbal cuing to maintain upright posture and to slow down. Pt owns a 4 WW, which may be unsafe at this point. Recommend FWW during his stay. Pt would benefit from further acute PT txs and will require placement prior to returning home.    Physical Therapy Treatment completed.   Bed Mobility:  Supine to Sit: Minimal Assist  Transfers: Sit to Stand: Minimal Assist  Gait: Level Of Assist: Contact Guard Assist with 4-Wheel Walker       Plan of Care: Will benefit from Physical Therapy 4 times per week  Discharge Recommendations: Equipment: Will Continue to Assess for Equipment Needs. Post-acute therapy Discharge to a transitional care facility for continued skilled therapy services.     See \"Rehab Therapy-Acute\" Patient Summary Report for complete documentation.       "

## 2017-06-27 NOTE — PROGRESS NOTES
Pt. discharged to [facility]. Pt left unit via wheelchair with escort. IV discontinued. Reviewed discharge instructions, and follow up appointments. Patient states understanding of all the instructions. Discharge instructions and personal belongings with patient upon leaving.

## 2017-08-03 ENCOUNTER — OFFICE VISIT (OUTPATIENT)
Dept: MEDICAL GROUP | Facility: PHYSICIAN GROUP | Age: 82
End: 2017-08-03
Payer: MEDICARE

## 2017-08-03 ENCOUNTER — HOSPITAL ENCOUNTER (OUTPATIENT)
Dept: RADIOLOGY | Facility: MEDICAL CENTER | Age: 82
End: 2017-08-03
Attending: INTERNAL MEDICINE
Payer: MEDICARE

## 2017-08-03 VITALS
TEMPERATURE: 97.7 F | BODY MASS INDEX: 22.96 KG/M2 | HEIGHT: 69 IN | WEIGHT: 155 LBS | HEART RATE: 92 BPM | DIASTOLIC BLOOD PRESSURE: 68 MMHG | RESPIRATION RATE: 12 BRPM | SYSTOLIC BLOOD PRESSURE: 122 MMHG | OXYGEN SATURATION: 98 %

## 2017-08-03 DIAGNOSIS — S32.011D CLOSED STABLE BURST FRACTURE OF FIRST LUMBAR VERTEBRA WITH ROUTINE HEALING, SUBSEQUENT ENCOUNTER: ICD-10-CM

## 2017-08-03 DIAGNOSIS — Z09 HOSPITAL DISCHARGE FOLLOW-UP: ICD-10-CM

## 2017-08-03 PROBLEM — S32.011A CLOSED STABLE BURST FRACTURE OF FIRST LUMBAR VERTEBRA (HCC): Status: ACTIVE | Noted: 2017-08-03

## 2017-08-03 PROCEDURE — 99213 OFFICE O/P EST LOW 20 MIN: CPT | Performed by: INTERNAL MEDICINE

## 2017-08-03 PROCEDURE — 72100 X-RAY EXAM L-S SPINE 2/3 VWS: CPT

## 2017-08-03 NOTE — PROGRESS NOTES
Subjective:   Issa Cuello is a 85 y.o. male here today for hospital discharge follow up, Burst fracture     Hospital discharge follow-up  Pt was recently hospitalized for L1 burst fracture that was from a GLF that occurred while walking his dog and his dog was pulling hard. Neurosurgery was consulted and did not recommend surgery but instead recommended physical therapy. He was sent to [facility] and underwent physical therapy there. He has had improvement in pain since then. The pain is still present but is tolerable. It is worse with long periods of sitting or certain movements. It has improved. His wife takes care of ADL's at home and brought in paperwork to have help at home. He is still doing home physical therapy.     He will be pending neurosurgery follow up again in 6 weeks (3 months from hospitalization).        Current medicines (including changes today)  Current Outpatient Prescriptions   Medication Sig Dispense Refill   • senna-docusate (PERICOLACE OR SENOKOT S) 8.6-50 MG Tab Take 2 Tabs by mouth 2 times a day as needed for Constipation. 30 Tab 0   • senna-docusate (PERICOLACE OR SENOKOT S) 8.6-50 MG Tab Take 1 Tab by mouth 1 time daily as needed.     • vitamin D (CHOLECALCIFEROL) 1000 UNIT Tab Take 1,000 Units by mouth every day.     • alendronate (FOSAMAX) 35 MG tablet Take 35 mg by mouth every Sunday.     • levothyroxine (SYNTHROID) 75 MCG Tab TAKE ONE TABLET BY MOUTH EVERY DAY 90 Tab 1   • amlodipine (NORVASC) 5 MG Tab TAKE ONE TABLET BY MOUTH EVERY DAY 90 Tab 1   • prazosin (MINIPRESS) 1 MG Cap TAKE ONE CAPSULE BY MOUTH EVERY DAY EVENING 90 Cap 1   • pravastatin (PRAVACHOL) 20 MG Tab TAKE ONE TABLET BY MOUTH EVERY DAY EVENING 90 Tab 1   • carbidopa-levodopa SR (SINEMET CR)  MG per tablet Take 1 Tab by mouth every bedtime.     • carbidopa-levodopa (SINEMET)  MG Tab Take 1 Tab by mouth 2 Times a Day.     • Red Yeast Rice Extract (RED YEAST RICE PO) Take 1 Tab by mouth 2 Times a Day.  "    • Calcium Carbonate-Vitamin D (CALCIUM + D PO) Take 1 Tab by mouth every day.     • aspirin EC (ECOTRIN) 81 MG TBEC Take 81 mg by mouth every day.     • Omega-3 Fatty Acids (FISH OIL PO) Take 2 Tabs by mouth every day.       No current facility-administered medications for this visit.     He  has a past medical history of HTN (hypertension); Hypothyroidism; Dyslipidemia; Frequent urination at night; Polycythemia; Cancer (CMS-HCC) (1/2013); Arthritis; Parkinson's disease (CMS-HCC) (2/16); Osteopenia (1/16); and Cataract.    ROS   + back pain     Objective:     Blood pressure 122/68, pulse 92, temperature 36.5 °C (97.7 °F), resp. rate 12, height 1.753 m (5' 9\"), weight 70.308 kg (155 lb), SpO2 98 %. Body mass index is 22.88 kg/(m^2).   Physical Exam:  Constitutional: Alert & oriented, no acute distress  Eye: Conjunctiva clear, lids normal, no discharge  ENMT: Lips without lesions, normal external nose and ears   Respiratory: Unlabored respiratory effort, lungs clear to auscultation, no wheezes, no ronchi  Cardiovascular: Normal S1, S2, no murmur, no edema  Skin: Low back with no redness, breaks in skin, warmth  Neuro: Using FWW  Psych: Normal mood and affect      Assessment and Plan:   The following treatment plan was discussed    1. Hospital discharge follow-up  Pain is improving. Pt continues to work with physical therapy. He is pending follow-up appointment with neurosurgery. We'll order x-ray to follow up status of burst fracture. Otherwise patient is doing fairly well    2. Closed stable burst fracture of first lumbar vertebra with routine healing, subsequent encounter  As above  - DX-LUMBAR SPINE-2 OR 3 VIEWS; Future      Followup: Return in about 3 months (around 11/3/2017).    Please note that this dictation was created using voice recognition software. I have made every reasonable attempt to correct obvious errors, but I expect that there are errors of grammar and possibly content that I did not discover " before finalizing the note.

## 2017-08-03 NOTE — MR AVS SNAPSHOT
"Issa Garcessal   8/3/2017 10:00 AM   Office Visit   MRN: [id number]    Department:  [facility]   Dept Phone:  [phone]    Description:  Male : 1931   Provider:  Celso Steward M.D.           Reason for Visit     Follow-Up           Allergies as of 8/3/2017     Allergen Noted Reactions    Influenza Vaccines 2013         You were diagnosed with     Hospital discharge follow-up   [[id number]]       Closed stable burst fracture of first lumbar vertebra with routine healing, subsequent encounter   [[id number]]         Vital Signs     Blood Pressure Pulse Temperature Respirations Height Weight    122/68 mmHg 92 36.5 °C (97.7 °F) 12 1.753 m (5' 9\") 70.308 kg (155 lb)    Body Mass Index Oxygen Saturation Smoking Status             22.88 kg/m2 98% Never Smoker          Basic Information     Date Of Birth Sex Race Ethnicity Preferred Language    1931 Male White Non- English      Problem List              ICD-10-CM Priority Class Noted - Resolved    Dyslipidemia E78.5   Unknown - Present    Frequent urination at night R35.1   Unknown - Present    CKD (chronic kidney disease), stage III N18.3   2015 - Present    Parkinson's disease (CMS-HCC) G20   2016 - Present    Essential hypertension I10   9/15/2016 - Present    Polycythemia D75.1   9/15/2016 - Present    Skin cancer C44.90   9/15/2016 - Present    Osteoporosis M81.0   2017 - Present    Hypothyroidism E03.9   2017 - Present    BPH (benign prostatic hyperplasia) N40.0   2017 - Present    Burst fracture of lumbar vertebra (CMS-HCC) S32.001A   2017 - Present    Hospital discharge follow-up Z09   8/3/2017 - Present    Closed stable burst fracture of first lumbar vertebra (CMS-HCC) S32.011A   8/3/2017 - Present      Health Maintenance        Date Due Completion Dates    IMM DTaP/Tdap/Td Vaccine (1 - Tdap) 1950 ---    IMM PNEUMOCOCCAL 65+ (ADULT) LOW/MEDIUM RISK SERIES (1 of 2 - PCV13) 1996 ---    IMM INFLUENZA " (1) 2/6/2018 (Originally 9/1/2017) 9/28/2015 (Declined)    Override on 9/28/2015: Patient Declined (allergy to eggs)            Current Immunizations     SHINGLES VACCINE 2/1/2005      Below and/or attached are the medications your provider expects you to take. Review all of your home medications and newly ordered medications with your provider and/or pharmacist. Follow medication instructions as directed by your provider and/or pharmacist. Please keep your medication list with you and share with your provider. Update the information when medications are discontinued, doses are changed, or new medications (including over-the-counter products) are added; and carry medication information at all times in the event of emergency situations     Allergies:  INFLUENZA VACCINES - (reactions not documented)               Medications  Valid as of: August 03, 2017 -  1:12 PM    Generic Name Brand Name Tablet Size Instructions for use    Alendronate Sodium (Tab) FOSAMAX 35 MG Take 35 mg by mouth every Sunday.        AmLODIPine Besylate (Tab) NORVASC 5 MG TAKE ONE TABLET BY MOUTH EVERY DAY        Aspirin (Tablet Delayed Response) ECOTRIN 81 MG Take 81 mg by mouth every day.        Calcium Citrate-Vitamin D   Take 1 Tab by mouth every day.        Carbidopa-Levodopa (Tab) SINEMET  MG Take 1 Tab by mouth 2 Times a Day.        Carbidopa-Levodopa (Tab CR) SINEMET CR  MG Take 1 Tab by mouth every bedtime.        Cholecalciferol (Tab) cholecalciferol 1000 UNIT Take 1,000 Units by mouth every day.        Levothyroxine Sodium (Tab) SYNTHROID 75 MCG TAKE ONE TABLET BY MOUTH EVERY DAY        Omega-3 Fatty Acids   Take 2 Tabs by mouth every day.        Pravastatin Sodium (Tab) PRAVACHOL 20 MG TAKE ONE TABLET BY MOUTH EVERY DAY EVENING        Prazosin HCl (Cap) MINIPRESS 1 MG TAKE ONE CAPSULE BY MOUTH EVERY DAY EVENING        Red Yeast Rice Extract   Take 1 Tab by mouth 2 Times a Day.        Sennosides-Docusate Sodium (Tab)  PERICOLACE or SENOKOT S 8.6-50 MG Take 1 Tab by mouth 1 time daily as needed.        Sennosides-Docusate Sodium (Tab) PERICOLACE or SENOKOT S 8.6-50 MG Take 2 Tabs by mouth 2 times a day as needed for Constipation.        .                 Medicines prescribed today were sent to:     None      Medication refill instructions:       If your prescription bottle indicates you have medication refills left, it is not necessary to call your provider’s office. Please contact your pharmacy and they will refill your medication.    If your prescription bottle indicates you do not have any refills left, you may request refills at any time through one of the following ways: The online Nitro system (except Urgent Care), by calling your provider’s office, or by asking your pharmacy to contact your provider’s office with a refill request. Medication refills are processed only during regular business hours and may not be available until the next business day. Your provider may request additional information or to have a follow-up visit with you prior to refilling your medication.   *Please Note: Medication refills are assigned a new Rx number when refilled electronically. Your pharmacy may indicate that no refills were authorized even though a new prescription for the same medication is available at the pharmacy. Please request the medicine by name with the pharmacy before contacting your provider for a refill.        Your To Do List     Future Labs/Procedures Complete By Expires    DX-LUMBAR SPINE-2 OR 3 VIEWS  As directed 8/3/2018      Other Notes About Your Plan     No problems identified through SCP    Assess and Status Chronic Disease from Current and Prior Visits.     Query: None    Please have this patient sign a medical release for [facility] for [facility] and 2015           Nitro Access Code: Activation code not generated  Current Nitro Status: Active

## 2017-08-03 NOTE — ASSESSMENT & PLAN NOTE
Pt was recently hospitalized for L1 burst fracture that was from a GLF that occurred while walking his dog and his dog was pulling hard. Neurosurgery was consulted and did not recommend surgery but instead recommended physical therapy. He was sent to [facility] and underwent physical therapy there. He has had improvement in pain since then. The pain is still present but is tolerable. It is worse with long periods of sitting or certain movements. It has improved. His wife takes care of ADL's at home and brought in paperwork to have help at home. He is still doing home physical therapy.     He will be pending neurosurgery follow up again in 6 weeks (3 months from hospitalization).

## 2017-09-13 ENCOUNTER — PATIENT OUTREACH (OUTPATIENT)
Dept: HEALTH INFORMATION MANAGEMENT | Facility: OTHER | Age: 82
End: 2017-09-13

## 2017-09-13 NOTE — PROGRESS NOTES
Attempt #:1    WebIZ Checked & Epic Updated: yes  HealthConnect Verified: yes  Verify PCP: yes    Communication Preference Obtained: yes     Review Care Team: yes    Annual Wellness Visit Scheduling  1. Scheduling Status:Scheduled        Care Gap Scheduling (Attempt to Schedule EACH Overdue Care Gap!)     Health Maintenance Due   Topic Date Due   • IMM DTaP/Tdap/Td Vaccine (1 - Tdap) SCHEDULED   • IMM PNEUMOCOCCAL 65+ (ADULT) LOW/MEDIUM RISK SERIES (1 of 2 - PCV13) PER WIFE, PT. MIGHT [facility]ACC[facility]. SHE WILL BRING RECORD IN          [employer] Activation: already active  [employer] Keya: no  Virtual Visits: no  Opt In to Text Messages: no

## 2017-09-19 ENCOUNTER — OFFICE VISIT (OUTPATIENT)
Dept: MEDICAL GROUP | Facility: PHYSICIAN GROUP | Age: 82
End: 2017-09-19
Payer: MEDICARE

## 2017-09-19 ENCOUNTER — TELEPHONE (OUTPATIENT)
Dept: MEDICAL GROUP | Facility: PHYSICIAN GROUP | Age: 82
End: 2017-09-19

## 2017-09-19 VITALS
TEMPERATURE: 97.9 F | WEIGHT: 159 LBS | SYSTOLIC BLOOD PRESSURE: 126 MMHG | OXYGEN SATURATION: 97 % | DIASTOLIC BLOOD PRESSURE: 64 MMHG | HEART RATE: 94 BPM | BODY MASS INDEX: 23.55 KG/M2 | HEIGHT: 69 IN

## 2017-09-19 DIAGNOSIS — N18.30 CKD (CHRONIC KIDNEY DISEASE), STAGE III (HCC): ICD-10-CM

## 2017-09-19 DIAGNOSIS — E03.9 HYPOTHYROIDISM, UNSPECIFIED TYPE: ICD-10-CM

## 2017-09-19 DIAGNOSIS — Z00.00 WELLNESS EXAMINATION: ICD-10-CM

## 2017-09-19 DIAGNOSIS — S32.011D CLOSED STABLE BURST FRACTURE OF FIRST LUMBAR VERTEBRA WITH ROUTINE HEALING, SUBSEQUENT ENCOUNTER: ICD-10-CM

## 2017-09-19 DIAGNOSIS — G20.A1 PARKINSON'S DISEASE: ICD-10-CM

## 2017-09-19 DIAGNOSIS — M81.0 OSTEOPOROSIS, UNSPECIFIED OSTEOPOROSIS TYPE, UNSPECIFIED PATHOLOGICAL FRACTURE PRESENCE: ICD-10-CM

## 2017-09-19 DIAGNOSIS — E78.5 DYSLIPIDEMIA: ICD-10-CM

## 2017-09-19 DIAGNOSIS — I10 ESSENTIAL HYPERTENSION: ICD-10-CM

## 2017-09-19 DIAGNOSIS — N40.0 BENIGN PROSTATIC HYPERPLASIA, PRESENCE OF LOWER URINARY TRACT SYMPTOMS UNSPECIFIED, UNSPECIFIED MORPHOLOGY: ICD-10-CM

## 2017-09-19 PROCEDURE — G0439 PPPS, SUBSEQ VISIT: HCPCS | Performed by: INTERNAL MEDICINE

## 2017-09-19 ASSESSMENT — PATIENT HEALTH QUESTIONNAIRE - PHQ9: CLINICAL INTERPRETATION OF PHQ2 SCORE: 0

## 2017-09-19 NOTE — ASSESSMENT & PLAN NOTE
Pt in on amlodipine 5 mg daily for HTN.BP is at goal per JNC 8 critieria today. Denies chest pain, shortness of breath

## 2017-09-19 NOTE — PROGRESS NOTES
Chief Complaint   Patient presents with   • Annual Exam         HPI:  Issa Cuello is a 86 y.o. here for Medicare Annual Wellness Visit     Parkinson's disease ([facility])  [facility]. Pt is on sinemet for this. He feels that the symptoms are stable.     Osteoporosis  Pt remains on Fosamax, calcium, and vitamin D for this [facility] as well.     Hypothyroidism  Pt is on levothyroxine 75 mcg daily for this.  Most recent labs indicate good control of thyroid - [facility]. He reports some heat intolerance for the past few weeks.     Denies change in energy, constipation/diarrhea      Essential hypertension  Pt in on amlodipine 5 mg daily for HTN.BP is at goal per JNC 8 critieria today. Denies chest pain, shortness of breath    BPH (benign prostatic hyperplasia)  Pt remains on prazosin. He has 2 episodes of nocturia at night. Denies dysuria, fevers/chills.     Dyslipidemia  Pt in on pravastatin chronically for this.     Closed stable burst fracture of first lumbar vertebra (CMS-HCC)  Pt was hospitalized for this. He was seen by neurosurgery and recommended to do PT. He was released by neurosurgery and now is doing well with tylenol.     CKD (chronic kidney disease), stage III  Seen on labs, stable.         Patient Active Problem List    Diagnosis Date Noted   • Hospital discharge follow-up 08/03/2017   • Closed stable burst fracture of first lumbar vertebra (CMS-HCC) 08/03/2017   • Burst fracture of lumbar vertebra (CMS-HCC) 06/23/2017   • Osteoporosis 02/06/2017   • Hypothyroidism 02/06/2017   • BPH (benign prostatic hyperplasia) 02/06/2017   • Essential hypertension 09/15/2016   • Parkinson's disease (CMS-HCC) 04/04/2016   • CKD (chronic kidney disease), stage III 04/14/2015   • Dyslipidemia        Current Outpatient Prescriptions   Medication Sig Dispense Refill   • senna-docusate (PERICOLACE OR SENOKOT S) 8.6-50 MG Tab Take 2 Tabs by mouth 2 times a day as needed for  Constipation. 30 Tab 0   • senna-docusate (PERICOLACE OR SENOKOT S) 8.6-50 MG Tab Take 1 Tab by mouth 1 time daily as needed.     • vitamin D (CHOLECALCIFEROL) 1000 UNIT Tab Take 1,000 Units by mouth every day.     • alendronate (FOSAMAX) 35 MG tablet Take 35 mg by mouth every Sunday.     • levothyroxine (SYNTHROID) 75 MCG Tab TAKE ONE TABLET BY MOUTH EVERY DAY 90 Tab 1   • amlodipine (NORVASC) 5 MG Tab TAKE ONE TABLET BY MOUTH EVERY DAY 90 Tab 1   • prazosin (MINIPRESS) 1 MG Cap TAKE ONE CAPSULE BY MOUTH EVERY DAY EVENING 90 Cap 1   • pravastatin (PRAVACHOL) 20 MG Tab TAKE ONE TABLET BY MOUTH EVERY DAY EVENING 90 Tab 1   • carbidopa-levodopa SR (SINEMET CR)  MG per tablet Take 1 Tab by mouth every bedtime.     • carbidopa-levodopa (SINEMET)  MG Tab Take 1 Tab by mouth 2 Times a Day.     • Red Yeast Rice Extract (RED YEAST RICE PO) Take 1 Tab by mouth 2 Times a Day.     • Omega-3 Fatty Acids (FISH OIL PO) Take 2 Tabs by mouth every day.     • Calcium Carbonate-Vitamin D (CALCIUM + D PO) Take 1 Tab by mouth every day.     • aspirin EC (ECOTRIN) 81 MG TBEC Take 81 mg by mouth every day.       No current facility-administered medications for this visit.             Current supplements as per medication list.       Allergies: Influenza vaccines    Current social contact/activities: No     He  reports that he has never smoked. He has never used smokeless tobacco. He reports that he does not drink alcohol or use drugs.  Counseling given: Not Answered        DPA/Advanced Directive:  Patient has Advanced Directive on file.       ROS:    Gait: Uses a walker   Ostomy: no   Other tubes: no   Amputations: no   Chronic oxygen use: no   Last eye exam:08/2017   : Denies incontinence.       Screening:    Depression Screening    Little interest or pleasure in doing things?  0 - not at all  Feeling down, depressed , or hopeless? 0 - not at all  Patient Health Questionnaire Score: 0     If depressive symptoms identified  deferred to follow up visit unless specifically addressed in assessment and plan.    Interpretation of PHQ-9 Total Score   Score Severity   1-4 No Depression   5-9 Mild Depression   10-14 Moderate Depression   15-19 Moderately Severe Depression   20-27 Severe Depression    Screening for Cognitive Impairment    Three Minute Recall (apple, watch, francesca)  1/3 Francesca, apple, watch  Draw clock face with all 12 numbers set to the hand to show 10 minutes past 11 o'clock  1    Cognitive concerns identified deferred for follow up unless specifically addressed in assessment and plan.    Fall Risk Assessment    Has the patient had two or more falls in the last year or any fall with injury in the last year?  Yes    Safety Assessment    Throw rugs on floor.  No  Handrails on all stairs.  No  Good lighting in all hallways.  Yes  Difficulty hearing.  Yes  Patient counseled about all safety risks that were identified.    Functional Assessment ADLs    Are there any barriers preventing you from cooking for yourself or meeting nutritional needs?  No.    Are there any barriers preventing you from driving safely or obtaining transportation?  No.    Are there any barriers preventing you from using a telephone or calling for help?  No.    Are there any barriers preventing you from shopping?  No.    Are there any barriers preventing you from taking care of your own finances?  No.    Are there any barriers preventing you from managing your medications?  No.    Are currently engaging any exercise or physical activity?  No.       Health Maintenance Summary                IMM DTaP/Tdap/Td Vaccine Overdue 9[birth year]/1950     IMM PNEUMOCOCCAL 65+ (ADULT) LOW/MEDIUM RISK SERIES Overdue 9/12/1996     Annual Wellness Visit Overdue 9/16/2017      Done 9/15/2016 Visit Dx: Medicare annual wellness visit, subsequent     Patient has more history with this topic...    IMM INFLUENZA Postponed 2/6/2018 Originally 9/1/2017. Patient Refused     Patient Declined  "9/28/2015 allergy to eggs          Patient Care Team:  Celso Steward M.D. as PCP - General (Internal Medicine)  ANGELITO Stroud as Consulting Physician (Family Medicine)  Chad Howe M.D. as Consulting Physician (Neurosurgery)  Redd Cali M.D. as Consulting Physician (Geriatrics)      Social History   Substance Use Topics   • Smoking status: Never Smoker   • Smokeless tobacco: Never Used   • Alcohol use No     Family History   Problem Relation Age of Onset   • Osteoporosis Mother    • Heart Disease Mother    • Stroke Mother    • Cancer Mother      skin CA     He  has a past medical history of Arthritis; Cancer (CMS-HCC) (1/2013); Cataract; Dyslipidemia; Frequent urination at night; HTN (hypertension); Hypothyroidism; Osteopenia (1/16); Parkinson's disease (CMS-HCC) (2/16); and Polycythemia.   Past Surgical History:   Procedure Laterality Date   • CATARACT EXTRACTION WITH IOL  4/3/14    right eye   • INGUINAL HERNIA REPAIR      x2   • OTHER      Moh's procedure for skin cancer face and ear   • TONSILLECTOMY         Exam:     Blood pressure 126/64, pulse 94, temperature 36.6 °C (97.9 °F), height 1.753 m (5' 9\"), weight 72.1 kg (159 lb), SpO2 97 %. Body mass index is 23.48 kg/m².    Hearing fair.    Dentition fair  Alert, oriented in no acute distress.  Eye contact is good, speech goal directed, affect calm      Assessment and Plan. The following treatment and monitoring plan is recommended:    1. Wellness examination  - Annual Wellness Visit - Includes PPPS Subsequent ()    2. Parkinson's disease (CMS-HCC)  [facility]  - Patient identified as fall risk.  Appropriate orders and counseling given.  - Annual Wellness Visit - Includes PPPS Subsequent ()    3. Osteoporosis, unspecified osteoporosis type, unspecified pathological fracture presence  Continue current treatment  - Annual Wellness Visit - Includes PPPS Subsequent ()    4. Hypothyroidism, unspecified " type  Continue levothyrxoine  - Annual Wellness Visit - Includes PPPS Subsequent ()    5. Essential hypertension  Well controlled, continue current medication  - Annual Wellness Visit - Includes PPPS Subsequent ()    6. Benign prostatic hyperplasia, presence of lower urinary tract symptoms unspecified, unspecified morphology  Stable, continue current medication  - Annual Wellness Visit - Includes PPPS Subsequent ()    7. Dyslipidemia  Continue current medication  - Annual Wellness Visit - Includes PPPS Subsequent ()    8. Closed stable burst fracture of first lumbar vertebra with routine healing, subsequent encounter  Doing well, monitor clinically  - Annual Wellness Visit - Includes PPPS Subsequent ()    9. CKD (chronic kidney disease), stage III  Recheck labs periodically  - Annual Wellness Visit - Includes PPPS Subsequent ()    Services suggested: No services needed at this time  Health Care Screening: Age-appropriate preventive services Medicare covers discussed today and ordered if indicated.  Referrals offered: Community-based lifestyle interventions to reduce health risks and promote self-management and wellness, fall prevention, nutrition, physical activity, tobacco-use cessation, weight loss, and mental health services as per orders if indicated.    Discussion today about general wellness and lifestyle habits:    · Prevent falls and reduce trip hazards; Cautioned about securing or removing rugs.  · Have a working fire alarm and carbon monoxide detector;   · Engage in regular physical activity and social activities       Follow-up: Return in about 4 months (around 1/19/2018).

## 2017-09-19 NOTE — ASSESSMENT & PLAN NOTE
[facility]. Pt is on sinemet for this. He feels that the symptoms are stable.

## 2017-09-19 NOTE — TELEPHONE ENCOUNTER
ANNUAL WELLNESS VISIT PRE-VISIT PLANNING     1.  Reviewed note from last office visit with PCP: YES    2.  If any orders were placed at last visit, do we have Results/Consult Notes?        •  Labs - Labs were not ordered at last office visit.       •  Imaging - Imaging ordered, completed and results are in chart.       •  Referrals - Referral ordered, patient was seen and consult notes are in chart. Care Teams updated  YES.    3.  Immunizations were updated in Epic using WebIZ?: Epic matches WebIZ       •  WebIZ Recommendations: PREVNAR (PCV13)  and TDAP       •  Is patient due for Tdap? YES. Patient was not notified of copay.       •  Is patient due for Shingles? NO     4.  Patient is due for the following Health Maintenance Topics:   Health Maintenance Due   Topic Date Due   • IMM DTaP/Tdap/Td Vaccine (1 - Tdap) 09/12/1950   • IMM PNEUMOCOCCAL 65+ (ADULT) LOW/MEDIUM RISK SERIES (1 of 2 - PCV13) 09/12/1996   • Annual Wellness Visit  09/16/2017       - Patient is up-to-date on all Health Maintenance topics. No records have been requested at this time.    5.  Reviewed/Updated the following with patient:       •   Preferred Pharmacy? NO       •   Preferred Lab? NO       •   Medications? NO       •   Social History? NO       •   Family History? NO   Will discuss at appt    6.  Care Team Updated:       •   DME Company (gait device, O2, CPAP, etc.): N\A       •   Other Specialists (eye doctor, derm, GYN, cardiology, endo, etc): N\A    7.  Patient has the following Care Path diagnoses on Problem List:      8.  Specialty Comments was updated with diagnosis information provided by [facility]: YES    9.  Patient was advised: “This is a free wellness visit. The provider will screen for medical conditions to help you stay healthy. If you have other concerns to address you may be asked to discuss these at a separate visit or there may be an additional fee.”     6.  Patient was informed to arrive 15 min prior to their scheduled  appointment and bring in their medication bottles.

## 2017-09-19 NOTE — ASSESSMENT & PLAN NOTE
Pt is on levothyroxine 75 mcg daily for this.  Most recent labs indicate good control of [facility] - [facility]. He reports some heat intolerance for the past few weeks.     Denies change in energy, constipation/diarrhea

## 2017-12-28 ENCOUNTER — OFFICE VISIT (OUTPATIENT)
Dept: MEDICAL GROUP | Facility: PHYSICIAN GROUP | Age: 82
End: 2017-12-28
Payer: MEDICARE

## 2017-12-28 VITALS
HEART RATE: 77 BPM | HEIGHT: 69 IN | OXYGEN SATURATION: 96 % | TEMPERATURE: 97.5 F | DIASTOLIC BLOOD PRESSURE: 80 MMHG | WEIGHT: 164 LBS | BODY MASS INDEX: 24.29 KG/M2 | SYSTOLIC BLOOD PRESSURE: 148 MMHG | RESPIRATION RATE: 12 BRPM

## 2017-12-28 DIAGNOSIS — G20.A1 PARKINSON'S DISEASE: ICD-10-CM

## 2017-12-28 DIAGNOSIS — S32.011D CLOSED STABLE BURST FRACTURE OF FIRST LUMBAR VERTEBRA WITH ROUTINE HEALING, SUBSEQUENT ENCOUNTER: ICD-10-CM

## 2017-12-28 DIAGNOSIS — I10 ESSENTIAL HYPERTENSION: ICD-10-CM

## 2017-12-28 DIAGNOSIS — Z76.89 ENCOUNTER TO ESTABLISH CARE: ICD-10-CM

## 2017-12-28 PROBLEM — Z09 HOSPITAL DISCHARGE FOLLOW-UP: Status: RESOLVED | Noted: 2017-08-03 | Resolved: 2017-12-28

## 2017-12-28 PROCEDURE — 99214 OFFICE O/P EST MOD 30 MIN: CPT | Performed by: NURSE PRACTITIONER

## 2017-12-31 NOTE — ASSESSMENT & PLAN NOTE
Chronic problem well controlled on current medications. Does not monitor blood pressure at home. Denies any severe headache, dizziness, vision changes, chest pain, KENYON, palpitations, or lower extremity edema

## 2017-12-31 NOTE — PROGRESS NOTES
Subjective:     Chief Complaint   Patient presents with   • Establish Care     needing paperwork for RTC Access bus program       HPI  Issa Cuello is a 86 y.o. male here today to establish care. He is in need of RTC paperwork to fill out to request this service. He has brought this with him today     Essential hypertension  Chronic problem well controlled on current medications. Does not monitor blood pressure at home. Denies any severe headache, dizziness, vision changes, chest pain, KENYON, palpitations, or lower extremity edema      Parkinson's disease (CMS-[facility])  Ongoing chronic problem[facility] Treated with Sinemet    Closed stable burst fracture of first lumbar vertebra (CMS-HCC)  Hospitalized two months ago for bust fracture of lumbar spine s/p fall. Doing better slowly. Refused PT.        Diagnoses of Encounter to establish care, Parkinson's disease (CMS-[facility]), Essential hypertension, and Closed stable burst fracture of first lumbar vertebra with routine healing, subsequent encounter were pertinent to this visit.    Allergies: Influenza vaccines  Current medicines (including changes today)  Current Outpatient Prescriptions   Medication Sig Dispense Refill   • senna-docusate (PERICOLACE OR SENOKOT S) 8.6-50 MG Tab Take 2 Tabs by mouth 2 times a day as needed for Constipation. 30 Tab 0   • senna-docusate (PERICOLACE OR SENOKOT S) 8.6-50 MG Tab Take 1 Tab by mouth 1 time daily as needed.     • vitamin D (CHOLECALCIFEROL) 1000 UNIT Tab Take 1,000 Units by mouth every day.     • alendronate (FOSAMAX) 35 MG tablet Take 35 mg by mouth every Sunday.     • levothyroxine (SYNTHROID) 75 MCG Tab TAKE ONE TABLET BY MOUTH EVERY DAY 90 Tab 1   • amlodipine (NORVASC) 5 MG Tab TAKE ONE TABLET BY MOUTH EVERY DAY 90 Tab 1   • prazosin (MINIPRESS) 1 MG Cap TAKE ONE CAPSULE BY MOUTH EVERY DAY EVENING 90 Cap 1   • pravastatin (PRAVACHOL) 20 MG Tab TAKE ONE TABLET BY MOUTH EVERY DAY EVENING 90 Tab 1   •  "carbidopa-levodopa SR (SINEMET CR)  MG per tablet Take 1 Tab by mouth every bedtime.     • carbidopa-levodopa (SINEMET)  MG Tab Take 1 Tab by mouth 2 Times a Day.     • Red Yeast Rice Extract (RED YEAST RICE PO) Take 1 Tab by mouth 2 Times a Day.     • Omega-3 Fatty Acids (FISH OIL PO) Take 2 Tabs by mouth every day.     • Calcium Carbonate-Vitamin D (CALCIUM + D PO) Take 1 Tab by mouth every day.     • aspirin EC (ECOTRIN) 81 MG TBEC Take 81 mg by mouth every day.       No current facility-administered medications for this visit.        He  has a past medical history of Arthritis; Cancer (CMS-HCC) (1/2013); Cataract; Dyslipidemia; Frequent urination at night; HTN (hypertension); Hypothyroidism; Osteopenia (1/16); Parkinson's disease (CMS-HCC) (2/16); and Polycythemia.      ROS  As stated in HPI and additionally  CV: No chest pain, KENYON, LE edema  Neuro: +slow thinking, memory changes present. No dizziness     Objective:     Blood pressure 148/80, pulse 77, temperature 36.4 °C (97.5 °F), resp. rate 12, height 1.753 m (5' 9\"), weight 74.4 kg (164 lb), SpO2 96 %. Body mass index is 24.22 kg/m².  Physical Exam:  General: Alert, oriented, in no acute distress.  Eye contact is good, speech goal directed, affect calm  CNs grossly intact.  HEENT: conjunctiva non-injected, sclera non-icteric, EOMs intact. No lid edema or eye drainage.   Gross hearing intact.  Lungs: unlabored. clear to auscultation bilaterally with good excursion.  CV: regular rate and rhythm.   Ext: no edema, normal color and temperature.   Skin: No rashes or lesions in visible areas  Gait unsteady with walker    Assessment and Plan:   Assessment/Plan:  1. Encounter to establish care    2. Parkinson's disease (CMS-HCC)  Stable on current meds, [facility]    3. Essential hypertension  Stable on current meds    4. Closed stable burst fracture of first lumbar vertebra with routine healing, subsequent encounter  Stable, improving " slowly.     [facility] paperwork completed during visit today        Follow up:  Return in about 3 months (around 3/28/2018).    Educated in proper administration of medication(s) ordered today including safety, possible SE, risks, benefits, rationale and alternatives to therapy.   Supportive care, differential diagnoses, and indications for immediate follow-up discussed with patient.    Pathogenesis of diagnosis discussed including typical length and natural progression.    Instructed to return to clinic or nearest emergency department for any change in condition, further concerns, or worsening of symptoms.  Patient states understanding of the plan of care and discharge instructions.      Please note that this dictation was created using voice recognition software. I have made every reasonable attempt to correct obvious errors, but I expect that there are errors of grammar and possibly content that I did not discover before finalizing the note.    Followup: Return in about 3 months (around 3/28/2018). sooner should new symptoms or problems arise.

## 2018-01-04 ENCOUNTER — APPOINTMENT (RX ONLY)
Dept: URBAN - METROPOLITAN AREA CLINIC 20 | Facility: CLINIC | Age: 83
Setting detail: DERMATOLOGY
End: 2018-01-04

## 2018-01-04 DIAGNOSIS — Z85.828 PERSONAL HISTORY OF OTHER MALIGNANT NEOPLASM OF SKIN: ICD-10-CM

## 2018-01-04 DIAGNOSIS — L81.4 OTHER MELANIN HYPERPIGMENTATION: ICD-10-CM

## 2018-01-04 DIAGNOSIS — Z71.89 OTHER SPECIFIED COUNSELING: ICD-10-CM

## 2018-01-04 DIAGNOSIS — D18.0 HEMANGIOMA: ICD-10-CM

## 2018-01-04 DIAGNOSIS — L82.1 OTHER SEBORRHEIC KERATOSIS: ICD-10-CM

## 2018-01-04 DIAGNOSIS — D22 MELANOCYTIC NEVI: ICD-10-CM

## 2018-01-04 DIAGNOSIS — L57.0 ACTINIC KERATOSIS: ICD-10-CM

## 2018-01-04 PROBLEM — D22.9 MELANOCYTIC NEVI, UNSPECIFIED: Status: ACTIVE | Noted: 2018-01-04

## 2018-01-04 PROBLEM — D18.01 HEMANGIOMA OF SKIN AND SUBCUTANEOUS TISSUE: Status: ACTIVE | Noted: 2018-01-04

## 2018-01-04 PROBLEM — E03.9 HYPOTHYROIDISM, UNSPECIFIED: Status: ACTIVE | Noted: 2018-01-04

## 2018-01-04 PROBLEM — E78.5 HYPERLIPIDEMIA, UNSPECIFIED: Status: ACTIVE | Noted: 2018-01-04

## 2018-01-04 PROBLEM — I10 ESSENTIAL (PRIMARY) HYPERTENSION: Status: ACTIVE | Noted: 2018-01-04

## 2018-01-04 PROCEDURE — ? OBSERVATION

## 2018-01-04 PROCEDURE — ? LIQUID NITROGEN

## 2018-01-04 PROCEDURE — 99214 OFFICE O/P EST MOD 30 MIN: CPT | Mod: 25

## 2018-01-04 PROCEDURE — ? SUNSCREEN RECOMMENDATIONS

## 2018-01-04 PROCEDURE — ? COUNSELING

## 2018-01-04 PROCEDURE — 17004 DESTROY PREMAL LESIONS 15/>: CPT

## 2018-01-04 ASSESSMENT — LOCATION ZONE DERM
LOCATION ZONE: ARM
LOCATION ZONE: TRUNK
LOCATION ZONE: HAND
LOCATION ZONE: FACE
LOCATION ZONE: SCALP
LOCATION ZONE: EAR

## 2018-01-04 ASSESSMENT — LOCATION DETAILED DESCRIPTION DERM
LOCATION DETAILED: SUPERIOR THORACIC SPINE
LOCATION DETAILED: RIGHT VENTRAL DISTAL FOREARM
LOCATION DETAILED: LEFT SUPERIOR HELIX
LOCATION DETAILED: LEFT RADIAL DORSAL HAND
LOCATION DETAILED: RIGHT INFERIOR MEDIAL FOREHEAD
LOCATION DETAILED: LEFT DISTAL DORSAL FOREARM
LOCATION DETAILED: RIGHT DISTAL DORSAL FOREARM
LOCATION DETAILED: RIGHT MEDIAL UPPER BACK
LOCATION DETAILED: RIGHT RADIAL DORSAL HAND
LOCATION DETAILED: RIGHT ULNAR DORSAL HAND
LOCATION DETAILED: RIGHT CENTRAL FRONTAL SCALP
LOCATION DETAILED: POSTERIOR MID-PARIETAL SCALP
LOCATION DETAILED: LEFT SUPERIOR PARIETAL SCALP
LOCATION DETAILED: RIGHT SUPERIOR MEDIAL FOREHEAD
LOCATION DETAILED: LEFT SUPERIOR OCCIPITAL SCALP
LOCATION DETAILED: LEFT INFERIOR HELIX
LOCATION DETAILED: INFERIOR THORACIC SPINE
LOCATION DETAILED: RIGHT PROXIMAL DORSAL FOREARM
LOCATION DETAILED: RIGHT INFERIOR PREAURICULAR CHEEK
LOCATION DETAILED: LEFT PROXIMAL DORSAL FOREARM
LOCATION DETAILED: RIGHT SUPERIOR PREAURICULAR CHEEK
LOCATION DETAILED: LEFT ELBOW
LOCATION DETAILED: RIGHT INFERIOR HELIX
LOCATION DETAILED: MID-OCCIPITAL SCALP
LOCATION DETAILED: LEFT PROXIMAL RADIAL DORSAL FOREARM
LOCATION DETAILED: LEFT VENTRAL PROXIMAL FOREARM

## 2018-01-04 ASSESSMENT — LOCATION SIMPLE DESCRIPTION DERM
LOCATION SIMPLE: POSTERIOR SCALP
LOCATION SIMPLE: UPPER BACK
LOCATION SIMPLE: LEFT ELBOW
LOCATION SIMPLE: LEFT HAND
LOCATION SIMPLE: SCALP
LOCATION SIMPLE: LEFT OCCIPITAL SCALP
LOCATION SIMPLE: RIGHT EAR
LOCATION SIMPLE: RIGHT CHEEK
LOCATION SIMPLE: LEFT EAR
LOCATION SIMPLE: RIGHT HAND
LOCATION SIMPLE: RIGHT FOREARM
LOCATION SIMPLE: RIGHT FOREHEAD
LOCATION SIMPLE: RIGHT UPPER BACK
LOCATION SIMPLE: LEFT FOREARM
LOCATION SIMPLE: RIGHT SCALP

## 2018-01-04 NOTE — PROCEDURE: OBSERVATION
Body Location Override (Optional - Billing Will Still Be Based On Selected Body Map Location If Applicable): Vertex
X Size Of Lesion In Cm (Optional): 0
Detail Level: Detailed

## 2018-01-04 NOTE — PROCEDURE: LIQUID NITROGEN
Number Of Freeze-Thaw Cycles: 2 freeze-thaw cycles
Post-Care Instructions: I reviewed with the patient in detail post-care instructions. Patient is to wear sunprotection, and avoid picking at any of the treated lesions. Pt may apply Vaseline to crusted or scabbing areas.
Consent: The patient's consent was obtained including but not limited to risks of crusting, scabbing, blistering, scarring, darker or lighter pigmentary change, recurrence, incomplete removal and infection. RTC in 2 months if lesion(s) persistent.
Detail Level: Detailed
Duration Of Freeze Thaw-Cycle (Seconds): 10
Render Post-Care Instructions In Note?: yes

## 2018-04-03 ENCOUNTER — OFFICE VISIT (OUTPATIENT)
Dept: URGENT CARE | Facility: PHYSICIAN GROUP | Age: 83
End: 2018-04-03
Payer: MEDICARE

## 2018-04-03 ENCOUNTER — TELEPHONE (OUTPATIENT)
Dept: MEDICAL GROUP | Facility: PHYSICIAN GROUP | Age: 83
End: 2018-04-03

## 2018-04-03 VITALS
TEMPERATURE: 98.3 F | HEIGHT: 69 IN | DIASTOLIC BLOOD PRESSURE: 86 MMHG | SYSTOLIC BLOOD PRESSURE: 130 MMHG | OXYGEN SATURATION: 95 % | RESPIRATION RATE: 12 BRPM | HEART RATE: 90 BPM | WEIGHT: 164 LBS | BODY MASS INDEX: 24.29 KG/M2

## 2018-04-03 DIAGNOSIS — J06.9 VIRAL URI WITH COUGH: ICD-10-CM

## 2018-04-03 PROCEDURE — 99214 OFFICE O/P EST MOD 30 MIN: CPT | Performed by: FAMILY MEDICINE

## 2018-04-03 RX ORDER — BENZONATATE 200 MG/1
200 CAPSULE ORAL 3 TIMES DAILY PRN
Qty: 60 CAP | Refills: 0 | Status: SHIPPED | OUTPATIENT
Start: 2018-04-03

## 2018-04-03 ASSESSMENT — PAIN SCALES - GENERAL: PAINLEVEL: NO PAIN

## 2018-04-03 NOTE — PROGRESS NOTES
CC:  cough        Cough  This is a new problem. The current episode started 2 days ago. The problem has been unchanged. The problem occurs constantly. The cough is productive of clear sputum. Associated symptoms include : fatigue, but denies any:  muscle aches, fever. States cough is keeping him up at night.   Pertinent negatives include no   headaches, nausea, vomiting, diarrhea, sweats, weight loss or wheezing. Nothing aggravates the symptoms.  Patient has tried nothing for the symptoms. There is no history of asthma.        Past Medical History:   Diagnosis Date   • Parkinson's disease (CMS-HCC) 2/16   • Osteopenia 1/16   • Cancer (CMS-HCC) 1/2013    Lt. ear skin CA lesion removed, chin skin lesion.   • Arthritis    • Cataract    • Dyslipidemia    • Frequent urination at night    • HTN (hypertension)    • Hypothyroidism    • Polycythemia          Social History   Substance Use Topics   • Smoking status: Never Smoker   • Smokeless tobacco: Never Used   • Alcohol use No         Current Outpatient Prescriptions on File Prior to Visit   Medication Sig Dispense Refill   • senna-docusate (PERICOLACE OR SENOKOT S) 8.6-50 MG Tab Take 2 Tabs by mouth 2 times a day as needed for Constipation. 30 Tab 0   • senna-docusate (PERICOLACE OR SENOKOT S) 8.6-50 MG Tab Take 1 Tab by mouth 1 time daily as needed.     • vitamin D (CHOLECALCIFEROL) 1000 UNIT Tab Take 1,000 Units by mouth every day.     • alendronate (FOSAMAX) 35 MG tablet Take 35 mg by mouth every Sunday.     • levothyroxine (SYNTHROID) 75 MCG Tab TAKE ONE TABLET BY MOUTH EVERY DAY 90 Tab 1   • amlodipine (NORVASC) 5 MG Tab TAKE ONE TABLET BY MOUTH EVERY DAY 90 Tab 1   • prazosin (MINIPRESS) 1 MG Cap TAKE ONE CAPSULE BY MOUTH EVERY DAY EVENING 90 Cap 1   • carbidopa-levodopa SR (SINEMET CR)  MG per tablet Take 1 Tab by mouth every bedtime.     • carbidopa-levodopa (SINEMET)  MG Tab Take 1 Tab by mouth 2 Times a Day.     • Red Yeast Rice Extract (RED YEAST  "RICE PO) Take 1 Tab by mouth 2 Times a Day.     • Omega-3 Fatty Acids (FISH OIL PO) Take 2 Tabs by mouth every day.     • Calcium Carbonate-Vitamin D (CALCIUM + D PO) Take 1 Tab by mouth every day.     • aspirin EC (ECOTRIN) 81 MG TBEC Take 81 mg by mouth every day.     • pravastatin (PRAVACHOL) 20 MG Tab TAKE ONE TABLET BY MOUTH EVERY DAY EVENING 90 Tab 1     No current facility-administered medications on file prior to visit.                     Review of Systems   Constitutional: Negative for fever and weight loss.   HENT: negative for otalgia  Cardiovascular - denies chest pain or dyspnea  Respiratory: Positive for cough.  .  Negative for wheezing.    Neurological: Negative for headaches.   GI - denies nausea, vomiting or diarrhea  Neuro - denies numbness or tingling.            Objective:     Blood pressure 130/86, pulse 90, temperature 36.8 °C (98.3 °F), resp. rate 12, height 1.753 m (5' 9\"), weight 74.4 kg (164 lb), SpO2 95 %.    Physical Exam   Constitutional: patient is oriented to person, place, and time. Patient appears well-developed and well-nourished. No distress.   HENT:   Head: Normocephalic and atraumatic.   Right Ear: External ear normal.   Left Ear: External ear normal.   TMs nl  Nose: Mucosal edema  present. Right sinus exhibits no maxillary sinus tenderness. Left sinus exhibits no maxillary sinus tenderness.   Mouth/Throat: Mucous membranes are normal. No oral lesions.  No posterior pharyngeal erythema.  No oropharyngeal exudate or posterior oropharyngeal edema.   Eyes: Conjunctivae and EOM are normal. Pupils are equal, round, and reactive to light. Right eye exhibits no discharge. Left eye exhibits no discharge. No scleral icterus.   Neck: Normal range of motion. Neck supple. No tracheal deviation present.   Cardiovascular: Normal rate, regular rhythm and normal heart sounds.  Exam reveals no friction rub.    Pulmonary/Chest: Effort normal. No respiratory distress. Patient has no wheezes or " rhonchi. Patient has no rales.    Musculoskeletal:  exhibits no edema.   Lymphadenopathy:     Patient has no cervical adenopathy.      Neurological: patient is alert and oriented to person, place, and time.   Skin: Skin is warm and dry. No rash noted. No erythema.   Psychiatric: patient  has a normal mood and affect.  behavior is normal.   Nursing note and vitals reviewed.              Assessment/Plan:       1. Viral URI with cough     - benzonatate (TESSALON) 200 MG capsule; Take 1 Cap by mouth 3 times a day as needed for Cough.  Dispense: 60 Cap; Refill: 0         Follow up in one week if no improvement

## 2018-04-05 ENCOUNTER — OFFICE VISIT (OUTPATIENT)
Dept: MEDICAL GROUP | Facility: PHYSICIAN GROUP | Age: 83
End: 2018-04-05
Payer: MEDICARE

## 2018-04-05 VITALS
TEMPERATURE: 99 F | OXYGEN SATURATION: 92 % | BODY MASS INDEX: 22.51 KG/M2 | HEIGHT: 69 IN | SYSTOLIC BLOOD PRESSURE: 128 MMHG | HEART RATE: 91 BPM | DIASTOLIC BLOOD PRESSURE: 72 MMHG | WEIGHT: 152 LBS

## 2018-04-05 DIAGNOSIS — G20.A1 PARKINSON'S DISEASE: ICD-10-CM

## 2018-04-05 DIAGNOSIS — J06.9 VIRAL URI WITH COUGH: ICD-10-CM

## 2018-04-05 PROCEDURE — 99214 OFFICE O/P EST MOD 30 MIN: CPT | Performed by: NURSE PRACTITIONER

## 2018-04-05 RX ORDER — POLYETHYLENE GLYCOL 3350 17 G/17G
17 POWDER, FOR SOLUTION ORAL DAILY
COMMUNITY

## 2018-04-05 RX ORDER — CHOLECALCIFEROL (VITAMIN D3) 125 MCG
500 CAPSULE ORAL DAILY
COMMUNITY

## 2018-04-05 RX ORDER — DEXAMETHASONE 4 MG/1
4 TABLET ORAL 2 TIMES DAILY
Qty: 6 TAB | Refills: 0 | Status: SHIPPED | OUTPATIENT
Start: 2018-04-05

## 2018-04-05 RX ORDER — TAMSULOSIN HYDROCHLORIDE 0.4 MG/1
0.4 CAPSULE ORAL
COMMUNITY

## 2018-04-05 NOTE — PROGRESS NOTES
Subjective:     Chief Complaint   Patient presents with   • Cough     x4days   • Follow-Up     urgent care x2days       HPI  Issa Cuello is a 86 y.o. male here today with his wife for cough.   He was seen in UC 2 days ago and diagnosed with viral URI and prescribed tessalon pearls. He is only minimally improved.   Symptoms started late Sunday, so about 4 days ago. Symptoms were most severe on Monday.   Significant cough and excessive thick, clear mucus. No associated sob, dyspnea, purulent sputum, or wheezing. No sinus pressure, sore throat, otalgia, or eye symptoms.  He does have low grade fever of 99. He was very weak last night, unsteady, fatigued. Lack of appetite with spitting up saliva. No actual N/V or abd pain, but he feels like stomach muscles are aching. No other treatments tried besides the cough medication. [facility] but has not started this yet.    Parkinson's disease (CMS-HCC)  Chronic problem that is managed with Sinemet, [facility] about [facility]. His symptoms are well controlled.        Diagnoses of Parkinson's disease (CMS-HCC) and Viral URI with cough were pertinent to this visit.    Allergies: Influenza vaccines  Current medicines (including changes today)  Current Outpatient Prescriptions   Medication Sig Dispense Refill   • polyethylene glycol 3350 (MIRALAX) Powder Take 17 g by mouth every day.     • cyanocobalamin (VITAMIN B-12) 500 MCG Tab Take 500 mcg by mouth every day.     • tamsulosin (FLOMAX) 0.4 MG capsule Take 0.4 mg by mouth ONE-HALF HOUR AFTER BREAKFAST.     • dexamethasone (DECADRON) 4 MG Tab Take 1 Tab by mouth 2 times a day. 6 Tab 0   • benzonatate (TESSALON) 200 MG capsule Take 1 Cap by mouth 3 times a day as needed for Cough. 60 Cap 0   • vitamin D (CHOLECALCIFEROL) 1000 UNIT Tab Take 1,000 Units by mouth every day.     • alendronate (FOSAMAX) 35 MG tablet Take 35 mg by mouth every Sunday.     •  "levothyroxine (SYNTHROID) 75 MCG Tab TAKE ONE TABLET BY MOUTH EVERY DAY 90 Tab 1   • amlodipine (NORVASC) 5 MG Tab TAKE ONE TABLET BY MOUTH EVERY DAY 90 Tab 1   • carbidopa-levodopa SR (SINEMET CR)  MG per tablet Take 1 Tab by mouth every bedtime.     • carbidopa-levodopa (SINEMET)  MG Tab Take 1 Tab by mouth 2 Times a Day.     • Red Yeast Rice Extract (RED YEAST RICE PO) Take 1 Tab by mouth 2 Times a Day.     • Omega-3 Fatty Acids (FISH OIL PO) Take 2 Tabs by mouth every day.     • Calcium Carbonate-Vitamin D (CALCIUM + D PO) Take 1 Tab by mouth every day.     • aspirin EC (ECOTRIN) 81 MG TBEC Take 81 mg by mouth every day.     • senna-docusate (PERICOLACE OR SENOKOT S) 8.6-50 MG Tab Take 2 Tabs by mouth 2 times a day as needed for Constipation. 30 Tab 0     No current facility-administered medications for this visit.        He  has a past medical history of Arthritis; Cancer (CMS-HCC) (1/2013); Cataract; Dyslipidemia; Frequent urination at night; HTN (hypertension); Hypothyroidism; Osteopenia (1/16); Parkinson's disease (CMS-HCC) (2/16); and Polycythemia.      ROS  As stated in HPI and additionally  Gen: + Fever, weakness, malaise  Neuro: No unusual headache or dizziness  HEENT: see HPI  Pulm: see HPI     Objective:     Blood pressure 128/72, pulse 91, temperature 37.2 °C (99 °F), height 1.753 m (5' 9\"), weight 68.9 kg (152 lb), SpO2 92 %. Body mass index is 22.45 kg/m².  Physical Exam:  General: Alert, oriented, in no acute distress.  Eye contact is good, speech goal directed, affect calm  HEENT: conjunctiva non-injected, sclera non-icteric, EOMs intact. No lid edema or eye drainage.   Pinna normal without skin lesions. TM pearly gray left, but right TM not visible due to cerumen. Gross hearing intact.  Nasal turbinates edema and clear drainage  Oral mucous membranes pink and moist with no lesions. Oropharynx mild erythema. No exudate.  Neck: Supple. No adenopathy or masses in the cervical or " supraclavicular regions.   Lungs: unlabored. clear to auscultation bilaterally with good excursion.  CV: regular rate and rhythm. No murmurs.  Ext: no edema, normal color   Gait steady with rolling walker, but appears slightly weak overall.     Assessment and Plan:   Assessment/Plan:  1. Parkinson's disease (CMS-HCC)  Chronic problem. Continue Sinemet and [facility]    2. Viral URI with cough  No sign of bacterial etiology. I have encouraged him to start using the ipratropium nasal spray to control the drainage. Continue the Tessalon pearls for cough. Sleep with head elevated. Offered Decadron 4 mg twice daily for 3 days and it is at the pharmacy if they choose to use this. Please notify us if symptoms worsen       Follow up:  Return in about 6 months (around 10/5/2018).    Educated in proper administration of medication(s) ordered today including safety, possible SE, risks, benefits, rationale and alternatives to therapy.   Supportive care, differential diagnoses, and indications for immediate follow-up discussed with patient.    Pathogenesis of diagnosis discussed including typical length and natural progression.    Instructed to return to clinic or nearest emergency department for any change in condition, further concerns, or worsening of symptoms.  Patient states understanding of the plan of care and discharge instructions.      Please note that this dictation was created using voice recognition software. I have made every reasonable attempt to correct obvious errors, but I expect that there are errors of grammar and possibly content that I did not discover before finalizing the note.    Followup: Return in about 6 months (around 10/5/2018). sooner should new symptoms or problems arise.

## 2018-04-05 NOTE — ASSESSMENT & PLAN NOTE
Chronic problem that is managed with Sinemet, [facility]. His symptoms are well controlled.

## 2018-05-11 ENCOUNTER — TELEPHONE (OUTPATIENT)
Dept: MEDICAL GROUP | Facility: PHYSICIAN GROUP | Age: 83
End: 2018-05-11

## 2018-12-11 ENCOUNTER — APPOINTMENT (RX ONLY)
Dept: URBAN - METROPOLITAN AREA CLINIC 20 | Facility: CLINIC | Age: 83
Setting detail: DERMATOLOGY
End: 2018-12-11

## 2018-12-11 DIAGNOSIS — L81.4 OTHER MELANIN HYPERPIGMENTATION: ICD-10-CM

## 2018-12-11 DIAGNOSIS — D485 NEOPLASM OF UNCERTAIN BEHAVIOR OF SKIN: ICD-10-CM

## 2018-12-11 PROBLEM — D48.5 NEOPLASM OF UNCERTAIN BEHAVIOR OF SKIN: Status: ACTIVE | Noted: 2018-12-11

## 2018-12-11 PROCEDURE — ? COUNSELING

## 2018-12-11 PROCEDURE — ? SUNSCREEN RECOMMENDATIONS

## 2018-12-11 PROCEDURE — 69100 BIOPSY OF EXTERNAL EAR: CPT

## 2018-12-11 PROCEDURE — ? BIOPSY BY SHAVE METHOD

## 2018-12-11 PROCEDURE — 99213 OFFICE O/P EST LOW 20 MIN: CPT | Mod: 25

## 2018-12-11 ASSESSMENT — LOCATION SIMPLE DESCRIPTION DERM
LOCATION SIMPLE: RIGHT EAR
LOCATION SIMPLE: RIGHT HAND
LOCATION SIMPLE: LEFT HAND
LOCATION SIMPLE: LEFT FOREHEAD

## 2018-12-11 ASSESSMENT — LOCATION ZONE DERM
LOCATION ZONE: EAR
LOCATION ZONE: FACE
LOCATION ZONE: HAND

## 2018-12-11 ASSESSMENT — LOCATION DETAILED DESCRIPTION DERM
LOCATION DETAILED: LEFT INFERIOR FOREHEAD
LOCATION DETAILED: LEFT ULNAR DORSAL HAND
LOCATION DETAILED: RIGHT TRIANGULAR FOSSA
LOCATION DETAILED: RIGHT DORSAL MIDDLE METACARPOPHALANGEAL JOINT

## 2018-12-11 NOTE — PROCEDURE: BIOPSY BY SHAVE METHOD
Type Of Destruction Used: Curettage
Anesthesia Volume In Cc: 1
Billing Type: Third-Party Bill
X Size Of Lesion In Cm: 0
Bill 16938 For Specimen Handling/Conveyance To Laboratory?: no
Depth Of Biopsy: dermis
Biopsy Type: H and E
Was A Bandage Applied: Yes
Biopsy Method: Personna blade
Hemostasis: Drysol and Electrocautery
Notification Instructions: Patient will be notified of biopsy results. However, patient instructed to call the office if not contacted within 2 weeks.
Post-Care Instructions: I reviewed with the patient in detail post-care instructions. Patient is to keep the biopsy site clean once daily and then apply petroleum and bandaid  until healed.
Dressing: Band-Aid
Consent: Written consent was obtained and risks were reviewed including but not limited to scarring, infection, bleeding, scabbing, incomplete removal, nerve damage and allergy to anesthesia.
Lab: [id number]
Detail Level: Detailed
Wound Care: Bacitracin
Lab Facility: 
Anesthesia Type: 1% lidocaine with 1:100,000 epinephrine and a 1:12 solution of 8.4% sodium bicarbonate

## 2018-12-27 ENCOUNTER — APPOINTMENT (RX ONLY)
Dept: URBAN - METROPOLITAN AREA CLINIC 20 | Facility: CLINIC | Age: 83
Setting detail: DERMATOLOGY
End: 2018-12-27

## 2018-12-27 PROBLEM — D04.21 CARCINOMA IN SITU OF SKIN OF RIGHT EAR AND EXTERNAL AURICULAR CANAL: Status: ACTIVE | Noted: 2018-12-27

## 2018-12-27 PROCEDURE — ? MOHS SURGERY PHONE CONSULTATION

## 2018-12-27 NOTE — PROCEDURE: MOHS SURGERY PHONE CONSULTATION
Date Of Mohs Surgery: 01/29/2019
Patient Preferred Phone Number: [phone]
Has The Patient Ever Received A Transplant?: No
Pathology Accession #: [id number]
Patient's Insurance: [employer]
Patient Reported Location: Right Triangular Fossa
Has The Patient Ever Been Seen In Our Practice For Mohs Surgery Before?: Yes
Time Of Mohs Surgery: 9:30am
Referring Provider: Buffy Kee
Which Antibiotic Do They Take For Surgical Prophylaxis?: Amoxicillin (2 grams)
Office Location Of Mohs Surgery: [place] [id number]
Date Of Surgical Consultation If Needed: 12/27/2018
Detail Level: Simple

## 2018-12-28 NOTE — ASSESSMENT & PLAN NOTE
Hospitalized two months ago for bust fracture of lumbar spine s/p fall. Doing better slowly. Refused PT.    Telephone Encounter by Scotty Ascencio at 03/03/17 01:36 PM     Author:  Scotty Ascencio Service:  (none) Author Type:  Certified Medical Assistant     Filed:  03/03/17 01:36 PM Encounter Date:  3/2/2017 Status:  Signed     :  Scotty Ascencio (Certified Medical Assistant)       From: Meme Zavaleta  To: Ifrah Davis MD  Sent: 3/2/2017 10:06 PM CST  Subject: Medication Renewal Request    Original authorizing provider: MD Meme Bruce would like a refill of the following medications:  acetaminophen-codeine (TYLENOL #3) 300-30 MG per tablet [Darlyn Corbett MD]    Preferred pharmacy: [facility]:  Having pain in my right ear and jaw. Had my lung biopsy done Tuesday and   the Dr said this was normal. It is a very nagging ear ache pain in my ear   and jaw. Thanks-Crystal    Medication renewals requested in this message routed to other providers:  lisinopril (PRINIVIL,ZESTRIL) 20 MG tablet Teena Hugo MD]       Revision History        Date/Time User Provider Type Action    > 03/03/17 01:36 PM Scotty Ascencio Certified Medical Assistant Sign    Attribution information within the note text is not available.

## 2019-01-29 ENCOUNTER — APPOINTMENT (RX ONLY)
Dept: URBAN - METROPOLITAN AREA CLINIC 36 | Facility: CLINIC | Age: 84
Setting detail: DERMATOLOGY
End: 2019-01-29

## 2019-01-29 VITALS — SYSTOLIC BLOOD PRESSURE: 118 MMHG | HEART RATE: 76 BPM | DIASTOLIC BLOOD PRESSURE: 65 MMHG

## 2019-01-29 DIAGNOSIS — L57.8 OTHER SKIN CHANGES DUE TO CHRONIC EXPOSURE TO NONIONIZING RADIATION: ICD-10-CM

## 2019-01-29 PROBLEM — D04.21 CARCINOMA IN SITU OF SKIN OF RIGHT EAR AND EXTERNAL AURICULAR CANAL: Status: ACTIVE | Noted: 2019-01-29

## 2019-01-29 PROCEDURE — ? COUNSELING

## 2019-01-29 PROCEDURE — 17311 MOHS 1 STAGE H/N/HF/G: CPT

## 2019-01-29 PROCEDURE — ? MOHS SURGERY

## 2019-01-29 NOTE — PROCEDURE: MOHS SURGERY
Advancement Flap (Single) Text: The defect edges were debeveled with a #15 scalpel blade.  Given the location of the defect and the proximity to free margins a single advancement flap was deemed most appropriate.  Using a sterile surgical marker, an appropriate advancement flap was drawn incorporating the defect and placing the expected incisions within the relaxed skin tension lines where possible.    The area thus outlined was incised deep to adipose tissue with a #15 scalpel blade.  The skin margins were undermined to an appropriate distance in all directions utilizing iris scissors.
Manual Repair Warning Statement: We plan on removing the manually selected variable below in favor of our much easier automatic structured text blocks found in the previous tab. We decided to do this to help make the flow better and give you the full power of structured data. Manual selection is never going to be ideal in our platform and I would encourage you to avoid using manual selection from this point on, especially since I will be sunsetting this feature. It is important that you do one of two things with the customized text below. First, you can save all of the text in a word file so you can have it for future reference. Second, transfer the text to the appropriate area in the Library tab. Lastly, if there is a flap or graft type which we do not have you need to let us know right away so I can add it in before the variable is hidden. No need to panic, we plan to give you roughly 6 months to make the change.
Referred To Otolaryngology For Closure Text (Leave Blank If You Do Not Want): After obtaining clear surgical margins the patient was sent to otolaryngology for surgical repair.  The patient understands they will receive post-surgical care and follow-up from the referring physician's office.
Stage 12: Number Of Blocks?: 0
Melolabial Transposition Flap Text: The defect edges were debeveled with a #15 scalpel blade.  Given the location of the defect and the proximity to free margins a melolabial flap was deemed most appropriate.  Using a sterile surgical marker, an appropriate melolabial transposition flap was drawn incorporating the defect.    The area thus outlined was incised deep to adipose tissue with a #15 scalpel blade.  The skin margins were undermined to an appropriate distance in all directions utilizing iris scissors.
Wound Check: 6 weeks
Repair Hemostasis (Optional): Pinpoint electrocautery
Eye Shield Used: No
Surgeon Performing Repair (Optional): Celina Solares M.D.
Consent (Ear)/Introductory Paragraph: The rationale for Mohs was explained to the patient and consent was obtained. The risks, benefits and alternatives to therapy were discussed in detail. Specifically, the risks of ear deformity, infection, scarring, bleeding, prolonged wound healing, incomplete removal, allergy to anesthesia, nerve injury and recurrence were addressed. Prior to the procedure, the treatment site was clearly identified and confirmed by the patient. All components of Universal Protocol/PAUSE Rule completed.
Anesthesia Type: 1% lidocaine with epinephrine
Oculoplastic Surgeon Procedure Text (C): After obtaining clear surgical margins the patient was sent to oculoplastics for surgical repair.  The patient understands they will receive post-surgical care and follow-up from the referring physician's office.
Transposition Flap Text: The defect edges were debeveled with a #15 scalpel blade.  Given the location of the defect and the proximity to free margins a transposition flap was deemed most appropriate.  Using a sterile surgical marker, an appropriate transposition flap was drawn incorporating the defect.    The area thus outlined was incised deep to adipose tissue with a #15 scalpel blade.  The skin margins were undermined to an appropriate distance in all directions utilizing iris scissors.
Show Specific Providers When Referring To Others For Closure?: Yes
Asc Procedure Text (F): After obtaining clear surgical margins the patient was sent to an ASC for surgical repair.  The patient understands they will receive post-surgical care and follow-up from the ASC physician.
Surgeon: Celina Sloares MD
Mauc Instructions: By selecting yes to the question below the MAUC number will be added into the note.  This will be calculated automatically based on the diagnosis chosen, the size entered, the body zone selected (H,M,L) and the specific indications you chose. You will also have the option to override the Mohs AUC if you disagree with the automatically calculated number and this option is found in the Case Summary tab.
Hatchet Flap Text: The defect edges were debeveled with a #15 scalpel blade.  Given the location of the defect, shape of the defect and the proximity to free margins a hatchet flap was deemed most appropriate.  Using a sterile surgical marker, an appropriate hatchet flap was drawn incorporating the defect and placing the expected incisions within the relaxed skin tension lines where possible.    The area thus outlined was incised deep to adipose tissue with a #15 scalpel blade.  The skin margins were undermined to an appropriate distance in all directions utilizing iris scissors.
Complex Repair And Graft Additional Text (Will Appearing After The Standard Complex Repair Text): The complex repair was not sufficient to completely close the primary defect. The remaining additional defect was repaired with the graft mentioned below.
Secondary Intention Text (Leave Blank If You Do Not Want): The defect will heal with secondary intention.
Closure 4 Information: This tab is for additional flaps and grafts above and beyond our usual structured repairs.  Please note if you enter information here it will not currently bill and you will need to add the billing information manually.
Island Pedicle Flap Text: The defect edges were debeveled with a #15 scalpel blade.  Given the location of the defect, shape of the defect and the proximity to free margins an island pedicle advancement flap was deemed most appropriate.  Using a sterile surgical marker, an appropriate advancement flap was drawn incorporating the defect, outlining the appropriate donor tissue and placing the expected incisions within the relaxed skin tension lines where possible.    The area thus outlined was incised deep to adipose tissue with a #15 scalpel blade.  The skin margins were undermined to an appropriate distance in all directions around the primary defect and laterally outward around the island pedicle utilizing iris scissors.  There was minimal undermining beneath the pedicle flap.
Previous Accession (Optional): [id number]
W Plasty Text: The lesion was extirpated to the level of the fat with a #15 scalpel blade.  Given the location of the defect, shape of the defect and the proximity to free margins a W-plasty was deemed most appropriate for repair.  Using a sterile surgical marker, the appropriate transposition arms of the W-plasty were drawn incorporating the defect and placing the expected incisions within the relaxed skin tension lines where possible.    The area thus outlined was incised deep to adipose tissue with a #15 scalpel blade.  The skin margins were undermined to an appropriate distance in all directions utilizing iris scissors.  The opposing transposition arms were then transposed into place in opposite direction and anchored with interrupted buried subcutaneous sutures.
Burow's Advancement Flap Text: The defect edges were debeveled with a #15 scalpel blade.  Given the location of the defect and the proximity to free margins a Burow's advancement flap was deemed most appropriate.  Using a sterile surgical marker, the appropriate advancement flap was drawn incorporating the defect and placing the expected incisions within the relaxed skin tension lines where possible.    The area thus outlined was incised deep to adipose tissue with a #15 scalpel blade.  The skin margins were undermined to an appropriate distance in all directions utilizing iris scissors.
Initial Size Of Lesion: 0.6
Alar Island Pedicle Flap Text: The defect edges were debeveled with a #15 scalpel blade.  Given the location of the defect, shape of the defect and the proximity to the alar rim an island pedicle advancement flap was deemed most appropriate.  Using a sterile surgical marker, an appropriate advancement flap was drawn incorporating the defect, outlining the appropriate donor tissue and placing the expected incisions within the nasal ala running parallel to the alar rim. The area thus outlined was incised with a #15 scalpel blade.  The skin margins were undermined minimally to an appropriate distance in all directions around the primary defect and laterally outward around the island pedicle utilizing iris scissors.  There was minimal undermining beneath the pedicle flap.
Localized Dermabrasion With Wire Brush Text: The patient was draped in routine manner.  Localized dermabrasion using 3 x 17 mm wire brush was performed in routine manner to papillary dermis. This spot dermabrasion is being performed to complete skin cancer reconstruction. It also will eliminate the other sun damaged precancerous cells that are known to be part of the regional effect of a lifetime's worth of sun exposure. This localized dermabrasion is therapeutic and should not be considered cosmetic in any regard.
Epidermal Closure: none-secondary intention
Complex Repair And Flap Additional Text (Will Appearing After The Standard Complex Repair Text): The complex repair was not sufficient to completely close the primary defect. The remaining additional defect was repaired with the flap mentioned below.
Bilobed Transposition Flap Text: The defect edges were debeveled with a #15 scalpel blade.  Given the location of the defect and the proximity to free margins a bilobed transposition flap was deemed most appropriate.  Using a sterile surgical marker, an appropriate bilobe flap drawn around the defect.    The area thus outlined was incised deep to adipose tissue with a #15 scalpel blade.  The skin margins were undermined to an appropriate distance in all directions utilizing iris scissors.
Plastic Surgeon Procedure Text (B): After obtaining clear surgical margins the patient was sent to plastics for surgical repair.  The patient understands they will receive post-surgical care and follow-up from the referring physician's office.
Same Histology In Subsequent Stages Text: The pattern and morphology of the tumor is as described in the first stage.
Crescentic Advancement Flap Text: The defect edges were debeveled with a #15 scalpel blade.  Given the location of the defect and the proximity to free margins a crescentic advancement flap was deemed most appropriate.  Using a sterile surgical marker, the appropriate advancement flap was drawn incorporating the defect and placing the expected incisions within the relaxed skin tension lines where possible.    The area thus outlined was incised deep to adipose tissue with a #15 scalpel blade.  The skin margins were undermined to an appropriate distance in all directions utilizing iris scissors.
V-Y Plasty Text: The defect edges were debeveled with a #15 scalpel blade.  Given the location of the defect, shape of the defect and the proximity to free margins an V-Y advancement flap was deemed most appropriate.  Using a sterile surgical marker, an appropriate advancement flap was drawn incorporating the defect and placing the expected incisions within the relaxed skin tension lines where possible.    The area thus outlined was incised deep to adipose tissue with a #15 scalpel blade.  The skin margins were undermined to an appropriate distance in all directions utilizing iris scissors.
O-L Flap Text: The defect edges were debeveled with a #15 scalpel blade.  Given the location of the defect, shape of the defect and the proximity to free margins an O-L flap was deemed most appropriate.  Using a sterile surgical marker, an appropriate advancement flap was drawn incorporating the defect and placing the expected incisions within the relaxed skin tension lines where possible.    The area thus outlined was incised deep to adipose tissue with a #15 scalpel blade.  The skin margins were undermined to an appropriate distance in all directions utilizing iris scissors.
Consent (Lip)/Introductory Paragraph: The rationale for Mohs was explained to the patient and consent was obtained. The risks, benefits and alternatives to therapy were discussed in detail. Specifically, the risks of lip deformity, changes in the oral aperture, infection, scarring, bleeding, prolonged wound healing, incomplete removal, allergy to anesthesia, nerve injury and recurrence were addressed. Prior to the procedure, the treatment site was clearly identified and confirmed by the patient. All components of Universal Protocol/PAUSE Rule completed.
Lazy S Complex Repair Preamble Text (Leave Blank If You Do Not Want): Extensive wide undermining was performed.
Mid-Level Procedure Text (C): After obtaining clear surgical margins the patient was sent to a mid-level provider for surgical repair.  The patient understands they will receive post-surgical care and follow-up from the mid-level provider.
Consent (Nose)/Introductory Paragraph: The rationale for Mohs was explained to the patient and consent was obtained. The risks, benefits and alternatives to therapy were discussed in detail. Specifically, the risks of nasal deformity, changes in the flow of air through the nose, infection, scarring, bleeding, prolonged wound healing, incomplete removal, allergy to anesthesia, nerve injury and recurrence were addressed. Prior to the procedure, the treatment site was clearly identified and confirmed by the patient. All components of Universal Protocol/PAUSE Rule completed.
Number Of Stages: 1
Full Thickness Lip Wedge Repair (Flap) Text: Given the location of the defect and the proximity to free margins a full thickness wedge repair was deemed most appropriate.  Using a sterile surgical marker, the appropriate repair was drawn incorporating the defect and placing the expected incisions perpendicular to the vermilion border.  The vermilion border was also meticulously outlined to ensure appropriate reapproximation during the repair.  The area thus outlined was incised through and through with a #15 scalpel blade.  The muscularis and dermis were reaproximated with deep sutures following hemostasis. Care was taken to realign the vermilion border before proceeding with the superficial closure.  Once the vermilion was realigned the superfical and mucosal closure was finished.
Banner Transposition Flap Text: The defect edges were debeveled with a #15 scalpel blade.  Given the location of the defect and the proximity to free margins a Banner transposition flap was deemed most appropriate.  Using a sterile surgical marker, an appropriate flap drawn around the defect. The area thus outlined was incised deep to adipose tissue with a #15 scalpel blade.  The skin margins were undermined to an appropriate distance in all directions utilizing iris scissors.
Referring Physician (Optional): GABINO De La Cruz.
Bilateral Helical Rim Advancement Flap Text: The defect edges were debeveled with a #15 blade scalpel.  Given the location of the defect and the proximity to free margins (helical rim) a bilateral helical rim advancement flap was deemed most appropriate.  Using a sterile surgical marker, the appropriate advancement flaps were drawn incorporating the defect and placing the expected incisions between the helical rim and antihelix where possible.  The area thus outlined was incised through and through with a #15 scalpel blade.  With a skin hook and iris scissors, the flaps were gently and sharply undermined and freed up.
Rhombic Flap Text: The defect edges were debeveled with a #15 scalpel blade.  Given the location of the defect and the proximity to free margins a rhombic flap was deemed most appropriate.  Using a sterile surgical marker, an appropriate rhombic flap was drawn incorporating the defect.    The area thus outlined was incised deep to adipose tissue with a #15 scalpel blade.  The skin margins were undermined to an appropriate distance in all directions utilizing iris scissors.
Area L Indication Text: Tumors in this location are included in Area L (trunk and extremities).  Mohs surgery is indicated for larger tumors, or tumors with aggressive histologic features, in these anatomic locations.
Donor Site Anesthesia Type: same as repair anesthesia
Information: Selecting Yes will display possible errors in your note based on the variables you have selected. This validation is only offered as a suggestion for you. PLEASE NOTE THAT THE VALIDATION TEXT WILL BE REMOVED WHEN YOU FINALIZE YOUR NOTE. IF YOU WANT TO FAX A PRELIMINARY NOTE YOU WILL NEED TO TOGGLE THIS TO 'NO' IF YOU DO NOT WANT IT IN YOUR FAXED NOTE.
Provider Procedure Text (D): After obtaining clear surgical margins the defect was repaired by another provider.
Posterior Auricular Interpolation Flap Text: A decision was made to reconstruct the defect utilizing an interpolation axial flap and a staged reconstruction.  A telfa template was made of the defect.  This telfa template was then used to outline the posterior auricular interpolation flap.  The donor area for the pedicle flap was then injected with anesthesia.  The flap was excised through the skin and subcutaneous tissue down to the layer of the underlying musculature.  The pedicle flap was carefully excised within this deep plane to maintain its blood supply.  The edges of the donor site were undermined.   The donor site was closed in a primary fashion.  The pedicle was then rotated into position and sutured.  Once the tube was sutured into place, adequate blood supply was confirmed with blanching and refill.  The pedicle was then wrapped with xeroform gauze and dressed appropriately with a telfa and gauze bandage to ensure continued blood supply and protect the attached pedicle.
Consent 2/Introductory Paragraph: Mohs surgery was explained to the patient and consent was obtained. The risks, benefits and alternatives to therapy were discussed in detail. Specifically, the risks of infection, scarring, bleeding, prolonged wound healing, incomplete removal, allergy to anesthesia, nerve injury and recurrence were addressed. Prior to the procedure, the treatment site was clearly identified and confirmed by the patient. All components of Universal Protocol/PAUSE Rule completed.
O-T Plasty Text: The defect edges were debeveled with a #15 scalpel blade.  Given the location of the defect, shape of the defect and the proximity to free margins an O-T plasty was deemed most appropriate.  Using a sterile surgical marker, an appropriate O-T plasty was drawn incorporating the defect and placing the expected incisions within the relaxed skin tension lines where possible.    The area thus outlined was incised deep to adipose tissue with a #15 scalpel blade.  The skin margins were undermined to an appropriate distance in all directions utilizing iris scissors.
Medical Necessity Statement: Based on my medical judgement, Mohs surgery is the most appropriate treatment for this cancer compared to other treatments.
Anesthesia Volume In Cc: 9
Wound Care: Vaseline
S Plasty Text: Given the location and shape of the defect, and the orientation of relaxed skin tension lines, an S-plasty was deemed most appropriate for repair.  Using a sterile surgical marker, the appropriate outline of the S-plasty was drawn, incorporating the defect and placing the expected incisions within the relaxed skin tension lines where possible.  The area thus outlined was incised deep to adipose tissue with a #15 scalpel blade.  The skin margins were undermined to an appropriate distance in all directions utilizing iris scissors. The skin flaps were advanced over the defect.  The opposing margins were then approximated with interrupted buried subcutaneous sutures.
Consent (Temporal Branch)/Introductory Paragraph: The rationale for Mohs was explained to the patient and consent was obtained. The risks, benefits and alternatives to therapy were discussed in detail. Specifically, the risks of damage to the temporal branch of the facial nerve, infection, scarring, bleeding, prolonged wound healing, incomplete removal, allergy to anesthesia, and recurrence were addressed. Prior to the procedure, the treatment site was clearly identified and confirmed by the patient. All components of Universal Protocol/PAUSE Rule completed.
Tarsorrhaphy Text: A tarsorrhaphy was performed using Frost sutures.
Estimated Blood Loss (Cc): minimal
Ear Wedge Repair Text: A wedge excision was completed by carrying down an excision through the full thickness of the ear and cartilage with an inward facing Burow's triangle. The wound was then closed in a layered fashion.
Mercedes Flap Text: The defect edges were debeveled with a #15 scalpel blade.  Given the location of the defect, shape of the defect and the proximity to free margins a Mercedes flap was deemed most appropriate.  Using a sterile surgical marker, an appropriate advancement flap was drawn incorporating the defect and placing the expected incisions within the relaxed skin tension lines where possible. The area thus outlined was incised deep to adipose tissue with a #15 scalpel blade.  The skin margins were undermined to an appropriate distance in all directions utilizing iris scissors.
V-Y Flap Text: The defect edges were debeveled with a #15 scalpel blade.  Given the location of the defect, shape of the defect and the proximity to free margins a V-Y flap was deemed most appropriate.  Using a sterile surgical marker, an appropriate advancement flap was drawn incorporating the defect and placing the expected incisions within the relaxed skin tension lines where possible.    The area thus outlined was incised deep to adipose tissue with a #15 scalpel blade.  The skin margins were undermined to an appropriate distance in all directions utilizing iris scissors.
Repair Type: No repair - secondary intention
Melolabial Interpolation Flap Text: A decision was made to reconstruct the defect utilizing an interpolation axial flap and a staged reconstruction.  A telfa template was made of the defect.  This telfa template was then used to outline the melolabial interpolation flap.  The donor area for the pedicle flap was then injected with anesthesia.  The flap was excised through the skin and subcutaneous tissue down to the layer of the underlying musculature.  The pedicle flap was carefully excised within this deep plane to maintain its blood supply.  The edges of the donor site were undermined.   The donor site was closed in a primary fashion.  The pedicle was then rotated into position and sutured.  Once the tube was sutured into place, adequate blood supply was confirmed with blanching and refill.  The pedicle was then wrapped with xeroform gauze and dressed appropriately with a telfa and gauze bandage to ensure continued blood supply and protect the attached pedicle.
Intermediate Repair Preamble Text (Leave Blank If You Do Not Want): Undermining was performed with blunt dissection.
Consent 1/Introductory Paragraph: The rationale for Mohs was explained to the patient and consent was obtained. The risks, benefits and alternatives to therapy were discussed in detail. Specifically, the risks of infection, scarring, bleeding, prolonged wound healing, incomplete removal, allergy to anesthesia, nerve injury and recurrence were addressed. Prior to the procedure, the treatment site was clearly identified and confirmed by the patient. All components of Universal Protocol/PAUSE Rule completed.
Advancement Flap (Double) Text: The defect edges were debeveled with a #15 scalpel blade.  Given the location of the defect and the proximity to free margins a double advancement flap was deemed most appropriate.  Using a sterile surgical marker, the appropriate advancement flaps were drawn incorporating the defect and placing the expected incisions within the relaxed skin tension lines where possible.    The area thus outlined was incised deep to adipose tissue with a #15 scalpel blade.  The skin margins were undermined to an appropriate distance in all directions utilizing iris scissors.
Surgical Defect Width In Cm (Optional): 0.8
No Residual Tumor Seen Histology Text: There were no malignant cells seen in the sections examined.
Hemostasis: Electrocautery
Star Wedge Flap Text: The defect edges were debeveled with a #15 scalpel blade.  Given the location of the defect, shape of the defect and the proximity to free margins a star wedge flap was deemed most appropriate.  Using a sterile surgical marker, an appropriate rotation flap was drawn incorporating the defect and placing the expected incisions within the relaxed skin tension lines where possible. The area thus outlined was incised deep to adipose tissue with a #15 scalpel blade.  The skin margins were undermined to an appropriate distance in all directions utilizing iris scissors.
Mucosal Advancement Flap Text: Given the location of the defect, shape of the defect and the proximity to free margins a mucosal advancement flap was deemed most appropriate. Incisions were made with a 15 blade scalpel in the appropriate fashion along the cutaneous vermilion border and the mucosal lip. The remaining actinically damaged mucosal tissue was excised.  The mucosal advancement flap was then elevated to the gingival sulcus with care taken to preserve the neurovascular structures and advanced into the primary defect. Care was taken to ensure that precise realignment of the vermilion border was achieved.
Partial Purse String (Intermediate) Text: Given the location of the defect and the characteristics of the surrounding skin an intermediate purse string closure was deemed most appropriate.  Undermining was performed circumfirentially around the surgical defect.  A purse string suture was then placed and tightened. Wound tension only allowed a partial closure of the circular defect.
Inflammation Suggestive Of Cancer Camouflage Histology Text: There was a dense lymphocytic infiltrate which prevented adequate histologic evaluation of adjacent structures.
Surgeon/Pathologist Verbiage (Will Incorporate Name Of Surgeon From Intro If Not Blank): operated in two distinct and integrated capacities as the surgeon and pathologist.
Area M Indication Text: Tumors in this location are included in Area M (cheek, forehead, scalp, neck, jawline and pretibial skin).  Mohs surgery is indicated for tumors in these anatomic locations.
Bilobed Flap Text: The defect edges were debeveled with a #15 scalpel blade.  Given the location of the defect and the proximity to free margins a bilobe flap was deemed most appropriate.  Using a sterile surgical marker, an appropriate bilobe flap drawn around the defect.    The area thus outlined was incised deep to adipose tissue with a #15 scalpel blade.  The skin margins were undermined to an appropriate distance in all directions utilizing iris scissors.
Keystone Flap Text: The defect edges were debeveled with a #15 scalpel blade.  Given the location of the defect, shape of the defect a keystone flap was deemed most appropriate.  Using a sterile surgical marker, an appropriate keystone flap was drawn incorporating the defect, outlining the appropriate donor tissue and placing the expected incisions within the relaxed skin tension lines where possible. The area thus outlined was incised deep to adipose tissue with a #15 scalpel blade.  The skin margins were undermined to an appropriate distance in all directions around the primary defect and laterally outward around the flap utilizing iris scissors.
Eye Protection Verbiage: Before proceeding with the stage, a plastic scleral shield was inserted. The globe was anesthetized with a few drops of 1% lidocaine with 1:100,000 epinephrine. Then, an appropriate sized scleral shield was chosen and coated with lacrilube ointment. The shield was gently inserted and left in place for the duration of each stage. After the stage was completed, the shield was gently removed.
O-Z Plasty Text: The defect edges were debeveled with a #15 scalpel blade.  Given the location of the defect, shape of the defect and the proximity to free margins an O-Z plasty (double transposition flap) was deemed most appropriate.  Using a sterile surgical marker, the appropriate transposition flaps were drawn incorporating the defect and placing the expected incisions within the relaxed skin tension lines where possible.    The area thus outlined was incised deep to adipose tissue with a #15 scalpel blade.  The skin margins were undermined to an appropriate distance in all directions utilizing iris scissors.  Hemostasis was achieved with electrocautery.  The flaps were then transposed into place, one clockwise and the other counterclockwise, and anchored with interrupted buried subcutaneous sutures.
Xenograft Text: The defect edges were debeveled with a #15 scalpel blade.  Given the location of the defect, shape of the defect and the proximity to free margins a xenograft was deemed most appropriate.  The graft was then trimmed to fit the size of the defect.  The graft was then placed in the primary defect and oriented appropriately.
Closure 2 Information: This tab is for additional flaps and grafts, including complex repair and grafts and complex repair and flaps. You can also specify a different location for the additional defect, if the location is the same you do not need to select a new one. We will insert the automated text for the repair you select below just as we do for solitary flaps and grafts. Please note that at this time if you select a location with a different insurance zone you will need to override the ICD10 and CPT if appropriate.
Dorsal Nasal Flap Text: The defect edges were debeveled with a #15 scalpel blade.  Given the location of the defect and the proximity to free margins a dorsal nasal flap was deemed most appropriate.  Using a sterile surgical marker, an appropriate dorsal nasal flap was drawn around the defect.    The area thus outlined was incised deep to adipose tissue with a #15 scalpel blade.  The skin margins were undermined to an appropriate distance in all directions utilizing iris scissors.
Dressing: pressure dressing with telfa
Paramedian Forehead Flap Text: A decision was made to reconstruct the defect utilizing an interpolation axial flap and a staged reconstruction.  A telfa template was made of the defect.  This telfa template was then used to outline the paramedian forehead pedicle flap.  The donor area for the pedicle flap was then injected with anesthesia.  The flap was excised through the skin and subcutaneous tissue down to the layer of the underlying musculature.  The pedicle flap was carefully excised within this deep plane to maintain its blood supply.  The edges of the donor site were undermined.   The donor site was closed in a primary fashion.  The pedicle was then rotated into position and sutured.  Once the tube was sutured into place, adequate blood supply was confirmed with blanching and refill.  The pedicle was then wrapped with xeroform gauze and dressed appropriately with a telfa and gauze bandage to ensure continued blood supply and protect the attached pedicle.
Dermal Autograft Text: The defect edges were debeveled with a #15 scalpel blade.  Given the location of the defect, shape of the defect and the proximity to free margins a dermal autograft was deemed most appropriate.  Using a sterile surgical marker, the primary defect shape was transferred to the donor site. The area thus outlined was incised deep to adipose tissue with a #15 scalpel blade.  The harvested graft was then trimmed of adipose and epidermal tissue until only dermis was left.  The skin graft was then placed in the primary defect and oriented appropriately.
Subsequent Stages Histo Method Verbiage: Using a similar technique to that described above, a thin layer of tissue was removed from all areas where tumor was visible on the previous stage.  The tissue was again oriented, mapped, dyed, and processed as above.
Consent (Scalp)/Introductory Paragraph: The rationale for Mohs was explained to the patient and consent was obtained. The risks, benefits and alternatives to therapy were discussed in detail. Specifically, the risks of changes in hair growth pattern secondary to repair, infection, scarring, bleeding, prolonged wound healing, incomplete removal, allergy to anesthesia, nerve injury and recurrence were addressed. Prior to the procedure, the treatment site was clearly identified and confirmed by the patient. All components of Universal Protocol/PAUSE Rule completed.
No Repair - Repaired With Adjacent Surgical Defect Text (Leave Blank If You Do Not Want): After obtaining clear surgical margins the defect was repaired concurrently with another surgical defect which was in close approximation.
Bcc Infiltrative Histology Text: There were numerous aggregates of basaloid cells demonstrating an infiltrative pattern.
Mohs Case Number: [id number]
Optional Repair Type Statement (Will Appear After The Structured Text Of The Repair-Graft-Flap Type): with Puracol dressing.
Wound Care (No Sutures): Petrolatum
Repair Anesthesia Method: local infiltration
Cheiloplasty (Less Than 50%) Text: A decision was made to reconstruct the defect with a  cheiloplasty.  The defect was undermined extensively.  Additional obicularis oris muscle was excised with a 15 blade scalpel.  The defect was converted into a full thickness wedge, of less than 50% of the vertical height of the lip, to facilite a better cosmetic result.  Small vessels were then tied off with 5-0 monocyrl. The obicularis oris, superficial fascia, adipose and dermis were then reapproximated.  After the deeper layers were approximated the epidermis was reapproximated with particular care given to realign the vermilion border.
Mohs Method Verbiage: An incision at a 45 degree angle following the standard Mohs approach was done and the specimen was harvested as a microscopic controlled layer.
Helical Rim Advancement Flap Text: The defect edges were debeveled with a #15 blade scalpel.  Given the location of the defect and the proximity to free margins (helical rim) a double helical rim advancement flap was deemed most appropriate.  Using a sterile surgical marker, the appropriate advancement flaps were drawn incorporating the defect and placing the expected incisions between the helical rim and antihelix where possible.  The area thus outlined was incised through and through with a #15 scalpel blade.  With a skin hook and iris scissors, the flaps were gently and sharply undermined and freed up.
Anesthesia Volume In Cc: 6
Ftsg Text: The defect edges were debeveled with a #15 scalpel blade.  Given the location of the defect, shape of the defect and the proximity to free margins a full thickness skin graft was deemed most appropriate.  Using a sterile surgical marker, the primary defect shape was transferred to the donor site. The area thus outlined was incised deep to adipose tissue with a #15 scalpel blade.  The harvested graft was then trimmed of adipose tissue until only dermis and epidermis was left.  The skin margins of the secondary defect were undermined to an appropriate distance in all directions utilizing iris scissors.  The secondary defect was closed with interrupted buried subcutaneous sutures.  The skin edges were then re-apposed with running  sutures.  The skin graft was then placed in the primary defect and oriented appropriately.
Suture Removal: 7 days
H Plasty Text: Given the location of the defect, shape of the defect and the proximity to free margins a H-plasty was deemed most appropriate for repair.  Using a sterile surgical marker, the appropriate advancement arms of the H-plasty were drawn incorporating the defect and placing the expected incisions within the relaxed skin tension lines where possible. The area thus outlined was incised deep to adipose tissue with a #15 scalpel blade. The skin margins were undermined to an appropriate distance in all directions utilizing iris scissors.  The opposing advancement arms were then advanced into place in opposite direction and anchored with interrupted buried subcutaneous sutures.
Advancement-Rotation Flap Text: The defect edges were debeveled with a #15 scalpel blade.  Given the location of the defect, shape of the defect and the proximity to free margins an advancement-rotation flap was deemed most appropriate.  Using a sterile surgical marker, an appropriate flap was drawn incorporating the defect and placing the expected incisions within the relaxed skin tension lines where possible. The area thus outlined was incised deep to adipose tissue with a #15 scalpel blade.  The skin margins were undermined to an appropriate distance in all directions utilizing iris scissors.
Spiral Flap Text: The defect edges were debeveled with a #15 scalpel blade.  Given the location of the defect, shape of the defect and the proximity to free margins a spiral flap was deemed most appropriate.  Using a sterile surgical marker, an appropriate rotation flap was drawn incorporating the defect and placing the expected incisions within the relaxed skin tension lines where possible. The area thus outlined was incised deep to adipose tissue with a #15 scalpel blade.  The skin margins were undermined to an appropriate distance in all directions utilizing iris scissors.
Epidermal Closure Graft Donor Site (Optional): running
Interpolation Flap Text: A decision was made to reconstruct the defect utilizing an interpolation axial flap and a staged reconstruction.  A telfa template was made of the defect.  This telfa template was then used to outline the interpolation flap.  The donor area for the pedicle flap was then injected with anesthesia.  The flap was excised through the skin and subcutaneous tissue down to the layer of the underlying musculature.  The interpolation flap was carefully excised within this deep plane to maintain its blood supply.  The edges of the donor site were undermined.   The donor site was closed in a primary fashion.  The pedicle was then rotated into position and sutured.  Once the tube was sutured into place, adequate blood supply was confirmed with blanching and refill.  The pedicle was then wrapped with xeroform gauze and dressed appropriately with a telfa and gauze bandage to ensure continued blood supply and protect the attached pedicle.
Skin Substitute Text: The defect edges were debeveled with a #15 scalpel blade.  Given the location of the defect, shape of the defect and the proximity to free margins a skin substitute graft was deemed most appropriate.  The graft material was trimmed to fit the size of the defect. The graft was then placed in the primary defect and oriented appropriately.
Partial Purse String (Simple) Text: Given the location of the defect and the characteristics of the surrounding skin a simple purse string closure was deemed most appropriate.  Undermining was performed circumfirentially around the surgical defect.  A purse string suture was then placed and tightened. Wound tension only allowed a partial closure of the circular defect.
Graft Donor Site Bandage (Optional-Leave Blank If You Don't Want In Note): Aquaplast was fitted to the graft site and sewn into place. A pressure bandage were applied to the donor site and over the aquaplast bolster.
Trilobed Flap Text: The defect edges were debeveled with a #15 scalpel blade.  Given the location of the defect and the proximity to free margins a trilobed flap was deemed most appropriate.  Using a sterile surgical marker, an appropriate trilobed flap drawn around the defect.    The area thus outlined was incised deep to adipose tissue with a #15 scalpel blade.  The skin margins were undermined to an appropriate distance in all directions utilizing iris scissors.
Area H Indication Text: Tumors in this location are included in Area H (eyelids, eyebrows, nose, lips, chin, ear, pre-auricular, post-auricular, temple, genitalia, hands, feet, ankles and areola).  Tissue conservation is critical in these anatomic locations.
Graft Cartilage Fenestration Text: The cartilage was fenestrated with a 2mm punch biopsy to help facilitate graft survival and healing.
Mohs Rapid Report Verbiage: The area of clinically evident tumor was marked with skin marking ink and appropriately hatched.  The initial incision was made following the Mohs approach through the skin.  The specimen was taken to the lab, divided into the necessary number of pieces, chromacoded and processed according to the Mohs protocol.  This was repeated in successive stages until a tumor free defect was achieved.
Consent Type: Consent 1 (Standard)
Mohs Histo Method Verbiage: Each section was then chromacoded and processed in the Mohs lab using the Mohs protocol and submitted for frozen section.
Consent (Spinal Accessory)/Introductory Paragraph: The rationale for Mohs was explained to the patient and consent was obtained. The risks, benefits and alternatives to therapy were discussed in detail. Specifically, the risks of damage to the spinal accessory nerve, infection, scarring, bleeding, prolonged wound healing, incomplete removal, allergy to anesthesia, and recurrence were addressed. Prior to the procedure, the treatment site was clearly identified and confirmed by the patient. All components of Universal Protocol/PAUSE Rule completed.
Mastoid Interpolation Flap Text: A decision was made to reconstruct the defect utilizing an interpolation axial flap and a staged reconstruction.  A telfa template was made of the defect.  This telfa template was then used to outline the mastoid interpolation flap.  The donor area for the pedicle flap was then injected with anesthesia.  The flap was excised through the skin and subcutaneous tissue down to the layer of the underlying musculature.  The pedicle flap was carefully excised within this deep plane to maintain its blood supply.  The edges of the donor site were undermined.   The donor site was closed in a primary fashion.  The pedicle was then rotated into position and sutured.  Once the tube was sutured into place, adequate blood supply was confirmed with blanching and refill.  The pedicle was then wrapped with xeroform gauze and dressed appropriately with a telfa and gauze bandage to ensure continued blood supply and protect the attached pedicle.
Alternatives Discussed Intro (Do Not Add Period): I discussed alternative treatments to Mohs surgery and specifically discussed the risks and benefits of
Cartilage Graft Text: The defect edges were debeveled with a #15 scalpel blade.  Given the location of the defect, shape of the defect, the fact the defect involved a full thickness cartilage defect a cartilage graft was deemed most appropriate.  An appropriate donor site was identified, cleansed, and anesthetized. The cartilage graft was then harvested and transferred to the recipient site, oriented appropriately and then sutured into place.  The secondary defect was then repaired using a primary closure.
Graft Donor Site Dermal Sutures (Optional): 5-0 Polysorb
Consent 3/Introductory Paragraph: I gave the patient a chance to ask questions they had about the procedure.  Following this I explained the Mohs procedure and consent was obtained. The risks, benefits and alternatives to therapy were discussed in detail. Specifically, the risks of infection, scarring, bleeding, prolonged wound healing, incomplete removal, allergy to anesthesia, nerve injury and recurrence were addressed. Prior to the procedure, the treatment site was clearly identified and confirmed by the patient. All components of Universal Protocol/PAUSE Rule completed.
Cheek-To-Nose Interpolation Flap Text: A decision was made to reconstruct the defect utilizing an interpolation axial flap and a staged reconstruction.  A telfa template was made of the defect.  This telfa template was then used to outline the Cheek-To-Nose Interpolation flap.  The donor area for the pedicle flap was then injected with anesthesia.  The flap was excised through the skin and subcutaneous tissue down to the layer of the underlying musculature.  The interpolation flap was carefully excised within this deep plane to maintain its blood supply.  The edges of the donor site were undermined.   The donor site was closed in a primary fashion.  The pedicle was then rotated into position and sutured.  Once the tube was sutured into place, adequate blood supply was confirmed with blanching and refill.  The pedicle was then wrapped with xeroform gauze and dressed appropriately with a telfa and gauze bandage to ensure continued blood supply and protect the attached pedicle.
Cheek Interpolation Flap Text: A decision was made to reconstruct the defect utilizing an interpolation axial flap and a staged reconstruction.  A telfa template was made of the defect.  This telfa template was then used to outline the Cheek Interpolation flap.  The donor area for the pedicle flap was then injected with anesthesia.  The flap was excised through the skin and subcutaneous tissue down to the layer of the underlying musculature.  The interpolation flap was carefully excised within this deep plane to maintain its blood supply.  The edges of the donor site were undermined.   The donor site was closed in a primary fashion.  The pedicle was then rotated into position and sutured.  Once the tube was sutured into place, adequate blood supply was confirmed with blanching and refill.  The pedicle was then wrapped with xeroform gauze and dressed appropriately with a telfa and gauze bandage to ensure continued blood supply and protect the attached pedicle.
Cheiloplasty (Complex) Text: A decision was made to reconstruct the defect with a  cheiloplasty.  The defect was undermined extensively.  Additional obicularis oris muscle was excised with a 15 blade scalpel.  The defect was converted into a full thickness wedge to facilite a better cosmetic result.  Small vessels were then tied off with 5-0 monocyrl. The obicularis oris, superficial fascia, adipose and dermis were then reapproximated.  After the deeper layers were approximated the epidermis was reapproximated with particular care given to realign the vermilion border.
Location Indication Override (Is Already Calculated Based On Selected Body Location): Area H
Composite Graft Text: The defect edges were debeveled with a #15 scalpel blade.  Given the location of the defect, shape of the defect, the proximity to free margins and the fact the defect was full thickness a composite graft was deemed most appropriate.  The defect was outline and then transferred to the donor site.  A full thickness graft was then excised from the donor site. The graft was then placed in the primary defect, oriented appropriately and then sutured into place.  The secondary defect was then repaired using a primary closure.
Island Pedicle Flap With Canthal Suspension Text: The defect edges were debeveled with a #15 scalpel blade.  Given the location of the defect, shape of the defect and the proximity to free margins an island pedicle advancement flap was deemed most appropriate.  Using a sterile surgical marker, an appropriate advancement flap was drawn incorporating the defect, outlining the appropriate donor tissue and placing the expected incisions within the relaxed skin tension lines where possible. The area thus outlined was incised deep to adipose tissue with a #15 scalpel blade.  The skin margins were undermined to an appropriate distance in all directions around the primary defect and laterally outward around the island pedicle utilizing iris scissors.  There was minimal undermining beneath the pedicle flap. A suspension suture was placed in the canthal tendon to prevent tension and prevent ectropion.
Double Island Pedicle Flap Text: The defect edges were debeveled with a #15 scalpel blade.  Given the location of the defect, shape of the defect and the proximity to free margins a double island pedicle advancement flap was deemed most appropriate.  Using a sterile surgical marker, an appropriate advancement flap was drawn incorporating the defect, outlining the appropriate donor tissue and placing the expected incisions within the relaxed skin tension lines where possible.    The area thus outlined was incised deep to adipose tissue with a #15 scalpel blade.  The skin margins were undermined to an appropriate distance in all directions around the primary defect and laterally outward around the island pedicle utilizing iris scissors.  There was minimal undermining beneath the pedicle flap.
Purse String (Simple) Text: Given the location of the defect and the characteristics of the surrounding skin a purse string closure was deemed most appropriate.  Undermining was performed circumfirentially around the surgical defect.  A purse string suture was then placed and tightened.
Split-Thickness Skin Graft Text: The defect edges were debeveled with a #15 scalpel blade.  Given the location of the defect, shape of the defect and the proximity to free margins a split thickness skin graft was deemed most appropriate.  Using a sterile surgical marker, the primary defect shape was transferred to the donor site. The split thickness graft was then harvested.  The skin graft was then placed in the primary defect and oriented appropriately.
Tissue Cultured Epidermal Autograft Text: The defect edges were debeveled with a #15 scalpel blade.  Given the location of the defect, shape of the defect and the proximity to free margins a tissue cultured epidermal autograft was deemed most appropriate.  The graft was then trimmed to fit the size of the defect.  The graft was then placed in the primary defect and oriented appropriately.
Modified Advancement Flap Text: The defect edges were debeveled with a #15 scalpel blade.  Given the location of the defect, shape of the defect and the proximity to free margins a modified advancement flap was deemed most appropriate.  Using a sterile surgical marker, an appropriate advancement flap was drawn incorporating the defect and placing the expected incisions within the relaxed skin tension lines where possible.    The area thus outlined was incised deep to adipose tissue with a #15 scalpel blade.  The skin margins were undermined to an appropriate distance in all directions utilizing iris scissors.
Z Plasty Text: The lesion was extirpated to the level of the fat with a #15 scalpel blade.  Given the location of the defect, shape of the defect and the proximity to free margins a Z-plasty was deemed most appropriate for repair.  Using a sterile surgical marker, the appropriate transposition arms of the Z-plasty were drawn incorporating the defect and placing the expected incisions within the relaxed skin tension lines where possible.    The area thus outlined was incised deep to adipose tissue with a #15 scalpel blade.  The skin margins were undermined to an appropriate distance in all directions utilizing iris scissors.  The opposing transposition arms were then transposed into place in opposite direction and anchored with interrupted buried subcutaneous sutures.
Unna Boot Text: An Unna boot was placed to help immobilize the limb and facilitate more rapid healing.
Postop Diagnosis: same
A-T Advancement Flap Text: The defect edges were debeveled with a #15 scalpel blade.  Given the location of the defect, shape of the defect and the proximity to free margins an A-T advancement flap was deemed most appropriate.  Using a sterile surgical marker, an appropriate advancement flap was drawn incorporating the defect and placing the expected incisions within the relaxed skin tension lines where possible.    The area thus outlined was incised deep to adipose tissue with a #15 scalpel blade.  The skin margins were undermined to an appropriate distance in all directions utilizing iris scissors.
Detail Level: Detailed
Muscle Hinge Flap Text: The defect edges were debeveled with a #15 scalpel blade.  Given the size, depth and location of the defect and the proximity to free margins a muscle hinge flap was deemed most appropriate.  Using a sterile surgical marker, an appropriate hinge flap was drawn incorporating the defect. The area thus outlined was incised with a #15 scalpel blade.  The skin margins were undermined to an appropriate distance in all directions utilizing iris scissors.
Purse String (Intermediate) Text: Given the location of the defect and the characteristics of the surrounding skin a purse string intermediate closure was deemed most appropriate.  Undermining was performed circumfirentially around the surgical defect.  A purse string suture was then placed and tightened.
Bcc Histology Text: There were numerous aggregates of basaloid cells.
Epidermal Autograft Text: The defect edges were debeveled with a #15 scalpel blade.  Given the location of the defect, shape of the defect and the proximity to free margins an epidermal autograft was deemed most appropriate.  Using a sterile surgical marker, the primary defect shape was transferred to the donor site. The epidermal graft was then harvested.  The skin graft was then placed in the primary defect and oriented appropriately.
Consent (Marginal Mandibular)/Introductory Paragraph: The rationale for Mohs was explained to the patient and consent was obtained. The risks, benefits and alternatives to therapy were discussed in detail. Specifically, the risks of damage to the marginal mandibular branch of the facial nerve, infection, scarring, bleeding, prolonged wound healing, incomplete removal, allergy to anesthesia, and recurrence were addressed. Prior to the procedure, the treatment site was clearly identified and confirmed by the patient. All components of Universal Protocol/PAUSE Rule completed.
O-T Advancement Flap Text: The defect edges were debeveled with a #15 scalpel blade.  Given the location of the defect, shape of the defect and the proximity to free margins an O-T advancement flap was deemed most appropriate.  Using a sterile surgical marker, an appropriate advancement flap was drawn incorporating the defect and placing the expected incisions within the relaxed skin tension lines where possible.    The area thus outlined was incised deep to adipose tissue with a #15 scalpel blade.  The skin margins were undermined to an appropriate distance in all directions utilizing iris scissors.
Island Pedicle Flap-Requiring Vessel Identification Text: The defect edges were debeveled with a #15 scalpel blade.  Given the location of the defect, shape of the defect and the proximity to free margins an island pedicle advancement flap was deemed most appropriate.  Using a sterile surgical marker, an appropriate advancement flap was drawn, based on the axial vessel mentioned above, incorporating the defect, outlining the appropriate donor tissue and placing the expected incisions within the relaxed skin tension lines where possible.    The area thus outlined was incised deep to adipose tissue with a #15 scalpel blade.  The skin margins were undermined to an appropriate distance in all directions around the primary defect and laterally outward around the island pedicle utilizing iris scissors.  There was minimal undermining beneath the pedicle flap.
Graft Donor Site Epidermal Sutures (Optional): 5-0 Surgipro
Ear Star Wedge Flap Text: The defect edges were debeveled with a #15 blade scalpel.  Given the location of the defect and the proximity to free margins (helical rim) an ear star wedge flap was deemed most appropriate.  Using a sterile surgical marker, the appropriate flap was drawn incorporating the defect and placing the expected incisions between the helical rim and antihelix where possible.  The area thus outlined was incised through and through with a #15 scalpel blade.
Consent (Near Eyelid Margin)/Introductory Paragraph: The rationale for Mohs was explained to the patient and consent was obtained. The risks, benefits and alternatives to therapy were discussed in detail. Specifically, the risks of ectropion or eyelid deformity, infection, scarring, bleeding, prolonged wound healing, incomplete removal, allergy to anesthesia, nerve injury and recurrence were addressed. Prior to the procedure, the treatment site was clearly identified and confirmed by the patient. All components of Universal Protocol/PAUSE Rule completed.
Bi-Rhombic Flap Text: The defect edges were debeveled with a #15 scalpel blade.  Given the location of the defect and the proximity to free margins a bi-rhombic flap was deemed most appropriate.  Using a sterile surgical marker, an appropriate rhombic flap was drawn incorporating the defect. The area thus outlined was incised deep to adipose tissue with a #15 scalpel blade.  The skin margins were undermined to an appropriate distance in all directions utilizing iris scissors.
Rotation Flap Text: The defect edges were debeveled with a #15 scalpel blade.  Given the location of the defect, shape of the defect and the proximity to free margins a rotation flap was deemed most appropriate.  Using a sterile surgical marker, an appropriate rotation flap was drawn incorporating the defect and placing the expected incisions within the relaxed skin tension lines where possible.    The area thus outlined was incised deep to adipose tissue with a #15 scalpel blade.  The skin margins were undermined to an appropriate distance in all directions utilizing iris scissors.
Post-Care Instructions: I reviewed with the patient in detail post-care instructions. Patient is not to engage in any heavy lifting, exercise, or swimming for the next 14 days. Should the patient develop any fevers, chills, bleeding, severe pain patient will contact the office immediately.
Home Suture Removal Text: Patient was provided instructions on removing sutures and will remove their sutures at home.  If they have any questions or difficulties they will call the office.
Undermining Location (Optional): in the superficial subcutaneous fat
Non-Graft Cartilage Fenestration Text: The cartilage was fenestrated with a 2mm punch biopsy to help facilitate healing.
Rhomboid Transposition Flap Text: The defect edges were debeveled with a #15 scalpel blade.  Given the location of the defect and the proximity to free margins a rhomboid transposition flap was deemed most appropriate.  Using a sterile surgical marker, an appropriate rhomboid flap was drawn incorporating the defect.    The area thus outlined was incised deep to adipose tissue with a #15 scalpel blade.  The skin margins were undermined to an appropriate distance in all directions utilizing iris scissors.
Double O-Z Plasty Text: The defect edges were debeveled with a #15 scalpel blade.  Given the location of the defect, shape of the defect and the proximity to free margins a Double O-Z plasty (double transposition flap) was deemed most appropriate.  Using a sterile surgical marker, the appropriate transposition flaps were drawn incorporating the defect and placing the expected incisions within the relaxed skin tension lines where possible. The area thus outlined was incised deep to adipose tissue with a #15 scalpel blade.  The skin margins were undermined to an appropriate distance in all directions utilizing iris scissors.  Hemostasis was achieved with electrocautery.  The flaps were then transposed into place, one clockwise and the other counterclockwise, and anchored with interrupted buried subcutaneous sutures.

## 2019-01-29 NOTE — HPI: PROCEDURE (MOHS)
Has The Growth Been Previously Biopsied?: has been previously biopsied
Additional History: Referral from GABINO De La Cruz.

## 2019-02-05 ENCOUNTER — APPOINTMENT (RX ONLY)
Dept: URBAN - METROPOLITAN AREA CLINIC 36 | Facility: CLINIC | Age: 84
Setting detail: DERMATOLOGY
End: 2019-02-05

## 2019-02-05 DIAGNOSIS — Z48.817 ENCOUNTER FOR SURGICAL AFTERCARE FOLLOWING SURGERY ON THE SKIN AND SUBCUTANEOUS TISSUE: ICD-10-CM

## 2019-02-05 PROCEDURE — 99024 POSTOP FOLLOW-UP VISIT: CPT

## 2019-02-05 PROCEDURE — ? POST-OP WOUND CHECK

## 2019-02-05 ASSESSMENT — LOCATION SIMPLE DESCRIPTION DERM: LOCATION SIMPLE: RIGHT EAR

## 2019-02-05 ASSESSMENT — LOCATION DETAILED DESCRIPTION DERM: LOCATION DETAILED: RIGHT TRIANGULAR FOSSA

## 2019-02-05 ASSESSMENT — LOCATION ZONE DERM: LOCATION ZONE: EAR

## 2019-02-05 NOTE — PROCEDURE: POST-OP WOUND CHECK
Detail Level: Simple
Wound Dressing Override (Optional): Puracol with pressure dressing
Wound Evaluated By: Celina Solares md
Add 72343 Cpt? (Important Note: In 2017 The Use Of 64988 Is Being Tracked By Cms To Determine Future Global Period Reimbursement For Global Periods): yes

## 2019-02-12 ENCOUNTER — APPOINTMENT (RX ONLY)
Dept: URBAN - METROPOLITAN AREA CLINIC 36 | Facility: CLINIC | Age: 84
Setting detail: DERMATOLOGY
End: 2019-02-12

## 2019-02-12 DIAGNOSIS — Z48.817 ENCOUNTER FOR SURGICAL AFTERCARE FOLLOWING SURGERY ON THE SKIN AND SUBCUTANEOUS TISSUE: ICD-10-CM

## 2019-02-12 PROCEDURE — 99024 POSTOP FOLLOW-UP VISIT: CPT

## 2019-02-12 PROCEDURE — ? POST-OP WOUND CHECK

## 2019-02-12 ASSESSMENT — LOCATION SIMPLE DESCRIPTION DERM: LOCATION SIMPLE: RIGHT EAR

## 2019-02-12 ASSESSMENT — LOCATION DETAILED DESCRIPTION DERM: LOCATION DETAILED: RIGHT SUPERIOR CRUS OF ANTIHELIX

## 2019-02-12 ASSESSMENT — LOCATION ZONE DERM: LOCATION ZONE: EAR

## 2019-02-12 NOTE — PROCEDURE: POST-OP WOUND CHECK
Detail Level: Simple
Wound Evaluated By: Celina Solares md
Add 07925 Cpt? (Important Note: In 2017 The Use Of 41673 Is Being Tracked By Cms To Determine Future Global Period Reimbursement For Global Periods): yes

## 2019-03-23 NOTE — ED NOTES
Breaking RN,  Medicated as ordered.  waiting for CT.    You need 3 eight ounce servings of calcium/vitamin D daily. This includes spinach, kale, broccoli, almonds, milk, yogurt, or cottage cheese. Please complete fasting labs in May, 2019. I also advise you get the shingrix vaccine once in a lifetime. · Resting the elbow, forearm, and wrist. You’ll need to avoid movements that can make your symptoms worse. You also may need to avoid certain sports and types of work for a time. This helps relieve symptoms and prevent further damage to the tendons.   · [employer] © 5122-0188 [employer] [address]. [address] [facility] – [place], [place], [address] [place]. All rights reserved. This information is not intended as a substitute for professional medical care. Always follow your healthcare professional's instructions.         Radial Your healthcare provider may give you injections of an anti-inflammatory medicine, such as cortisone. This helps reduce swelling. You may have more pain at first. But in a few days, your elbow should feel better.   If surgery is needed  If your symptoms per 13. Repeat 5 times, or as instructed. Date Last Reviewed: 3/10/2016  © 6552-2002 [employer] [address]. [address] [facility] [place], [address] [place] [address]. All rights reserved. This information is not intended as a substitute for professional medical care.  Alw

## 2024-05-09 NOTE — THERAPY
Problem: Discharge Planning  Goal: Discharge to home or other facility with appropriate resources  Outcome: Progressing     Problem: Pain  Goal: Verbalizes/displays adequate comfort level or baseline comfort level  Outcome: Progressing     Problem: Skin/Tissue Integrity  Goal: Absence of new skin breakdown  Description: 1.  Monitor for areas of redness and/or skin breakdown  2.  Assess vascular access sites hourly  3.  Every 4-6 hours minimum:  Change oxygen saturation probe site  4.  Every 4-6 hours:  If on nasal continuous positive airway pressure, respiratory therapy assess nares and determine need for appliance change or resting period.  Outcome: Progressing     Problem: Safety - Adult  Goal: Free from fall injury  Outcome: Progressing     Problem: Chronic Conditions and Co-morbidities  Goal: Patient's chronic conditions and co-morbidity symptoms are monitored and maintained or improved  Outcome: Progressing   CHF Care Plan      Patient's EF (Ejection Fraction) is greater than 40%    Heart Failure Medications:  Diuretics:: Furosemide    (One of the following REQUIRED for EF </= 40%/SYSTOLIC FAILURE but MAY be used in EF% >40%/DIASTOLIC FAILURE)        ACE:: None        ARB:: None         ARNI:: None    (Beta Blockers)  NON- Evidenced Based Beta Blocker (for EF% >40%/DIASTOLIC FAILURE): None    Evidenced Based Beta Blocker::(REQUIRED for EF% <40%/SYSTOLIC FAILURE) Carvedilol- Coreg  ...................................................................................................................................................    Failed to redirect to the Timeline version of the [employer] SmartLink.      Patient's weights and intake/output reviewed    Daily Weight log at bedside, patient/family participation in use of log: \"yes    Patient's current weight today shows a difference of 4 lbs less than last documented weight.      Intake/Output Summary (Last 24 hours) at 5/9/2024 6101  Last data filed at 5/9/2024  "Speech Language Therapy Clinical Swallow Evaluation completed.  Functional Status: Clinical swallow evaluation completed on this date.  Patient pleasant and agreeable.  The patient reported he \"chokes on my pills all the time.  Its like I suck in the water too fast.\"  He followed directives to complete the oral [facility] exam which revealed reduced lingual coordination and strength.  Presentation of PO included all consistencies.  The patient presented with mild oral dysphagia and suspected pharyngeal dysphagia as seen by prolonged mastication of dry solids with delayed bolus formation, runny nose during PO and 3 second delayed initiation of swallow trigger.  The patient had coughing/choking x2 with thin liquids but no overt s/sx of aspiration with any other consistency consumed.  Provided extensive education to aspiration risks/PNA, s/sx of aspiration, SLP recommendations, and Parkinson disease and swallowing function.  At this time, recommend dysphagia 2/nectars diet.  The patient should be up to a chair or at 90 degrees for all PO intake.  SLP following during acute care, however, he would benefit from Home Health services to address dysphagia.    Recommendations - Diet: Diet / Liquid Recommendation: Nectar Thick Liquid, Dysphagia II                          Strategies: No Straws and Head of Bed at 90 Degrees                          Medication Administration: Medication Administration : Float Whole with Puree  Plan of Care: Will benefit from Speech Therapy 3 times per week  Post-Acute Therapy: Discharge to home with outpatient or home health for additional skilled therapy services.    See \"Rehab Therapy-Acute\" Patient Summary Report for complete documentation.   "